# Patient Record
Sex: MALE | Race: WHITE | NOT HISPANIC OR LATINO | Employment: FULL TIME | URBAN - METROPOLITAN AREA
[De-identification: names, ages, dates, MRNs, and addresses within clinical notes are randomized per-mention and may not be internally consistent; named-entity substitution may affect disease eponyms.]

---

## 2017-09-14 ENCOUNTER — APPOINTMENT (OUTPATIENT)
Dept: LAB | Age: 32
End: 2017-09-14
Attending: PREVENTIVE MEDICINE

## 2017-09-14 ENCOUNTER — TRANSCRIBE ORDERS (OUTPATIENT)
Dept: ADMINISTRATIVE | Age: 32
End: 2017-09-14

## 2017-09-14 ENCOUNTER — APPOINTMENT (OUTPATIENT)
Dept: RADIOLOGY | Age: 32
End: 2017-09-14
Attending: PREVENTIVE MEDICINE

## 2017-09-14 DIAGNOSIS — Z00.8 HEALTH EXAMINATION IN POPULATION SURVEY: ICD-10-CM

## 2017-09-14 DIAGNOSIS — Z00.8 HEALTH EXAMINATION IN POPULATION SURVEY: Primary | ICD-10-CM

## 2017-09-14 LAB
BACTERIA UR QL AUTO: NORMAL /HPF
BASOPHILS # BLD AUTO: 0.03 THOUSANDS/ΜL (ref 0–0.1)
BASOPHILS NFR BLD AUTO: 0 % (ref 0–1)
BILIRUB UR QL STRIP: NEGATIVE
BUN SERPL-MCNC: 16 MG/DL (ref 5–25)
CLARITY UR: NORMAL
COLOR UR: YELLOW
CREAT SERPL-MCNC: 1.03 MG/DL (ref 0.6–1.3)
EOSINOPHIL # BLD AUTO: 0.2 THOUSAND/ΜL (ref 0–0.61)
EOSINOPHIL NFR BLD AUTO: 2 % (ref 0–6)
ERYTHROCYTE [DISTWIDTH] IN BLOOD BY AUTOMATED COUNT: 12.3 % (ref 11.6–15.1)
GFR SERPL CREATININE-BSD FRML MDRD: 97 ML/MIN/1.73SQ M
GLUCOSE UR STRIP-MCNC: NEGATIVE MG/DL
HCT VFR BLD AUTO: 44 % (ref 36.5–49.3)
HGB BLD-MCNC: 15 G/DL (ref 12–17)
HGB UR QL STRIP.AUTO: NEGATIVE
HYALINE CASTS #/AREA URNS LPF: NORMAL /LPF
KETONES UR STRIP-MCNC: NEGATIVE MG/DL
LEUKOCYTE ESTERASE UR QL STRIP: NEGATIVE
LYMPHOCYTES # BLD AUTO: 3.24 THOUSANDS/ΜL (ref 0.6–4.47)
LYMPHOCYTES NFR BLD AUTO: 35 % (ref 14–44)
MCH RBC QN AUTO: 29.4 PG (ref 26.8–34.3)
MCHC RBC AUTO-ENTMCNC: 34.1 G/DL (ref 31.4–37.4)
MCV RBC AUTO: 86 FL (ref 82–98)
MONOCYTES # BLD AUTO: 0.78 THOUSAND/ΜL (ref 0.17–1.22)
MONOCYTES NFR BLD AUTO: 9 % (ref 4–12)
NEUTROPHILS # BLD AUTO: 4.94 THOUSANDS/ΜL (ref 1.85–7.62)
NEUTS SEG NFR BLD AUTO: 54 % (ref 43–75)
NITRITE UR QL STRIP: NEGATIVE
NON-SQ EPI CELLS URNS QL MICRO: NORMAL /HPF
NRBC BLD AUTO-RTO: 0 /100 WBCS
PH UR STRIP.AUTO: 7.5 [PH] (ref 4.5–8)
PLATELET # BLD AUTO: 253 THOUSANDS/UL (ref 149–390)
PMV BLD AUTO: 9.3 FL (ref 8.9–12.7)
PROT UR STRIP-MCNC: NEGATIVE MG/DL
RBC # BLD AUTO: 5.11 MILLION/UL (ref 3.88–5.62)
RBC #/AREA URNS AUTO: NORMAL /HPF
SP GR UR STRIP.AUTO: 1.02 (ref 1–1.03)
UROBILINOGEN UR QL STRIP.AUTO: 0.2 E.U./DL
WBC # BLD AUTO: 9.21 THOUSAND/UL (ref 4.31–10.16)
WBC #/AREA URNS AUTO: NORMAL /HPF

## 2017-09-14 PROCEDURE — 85025 COMPLETE CBC W/AUTO DIFF WBC: CPT

## 2017-09-14 PROCEDURE — 86870 RBC ANTIBODY IDENTIFICATION: CPT

## 2017-09-14 PROCEDURE — 82232 ASSAY OF BETA-2 PROTEIN: CPT | Performed by: PREVENTIVE MEDICINE

## 2017-09-14 PROCEDURE — 84202 ASSAY RBC PROTOPORPHYRIN: CPT

## 2017-09-14 PROCEDURE — 81001 URINALYSIS AUTO W/SCOPE: CPT | Performed by: PREVENTIVE MEDICINE

## 2017-09-14 PROCEDURE — 83655 ASSAY OF LEAD: CPT | Performed by: PREVENTIVE MEDICINE

## 2017-09-14 PROCEDURE — 82565 ASSAY OF CREATININE: CPT

## 2017-09-14 PROCEDURE — 82300 ASSAY OF CADMIUM: CPT

## 2017-09-14 PROCEDURE — 84520 ASSAY OF UREA NITROGEN: CPT

## 2017-09-14 PROCEDURE — 36415 COLL VENOUS BLD VENIPUNCTURE: CPT

## 2017-09-14 PROCEDURE — 83825 ASSAY OF MERCURY: CPT | Performed by: PREVENTIVE MEDICINE

## 2017-09-14 PROCEDURE — 82175 ASSAY OF ARSENIC: CPT | Performed by: PREVENTIVE MEDICINE

## 2017-09-14 PROCEDURE — 71010 HB CHEST X-RAY 1 VIEW FRONTAL: CPT

## 2017-09-14 PROCEDURE — 82570 ASSAY OF URINE CREATININE: CPT | Performed by: PREVENTIVE MEDICINE

## 2017-09-15 LAB
ARSENIC 24H UR-MCNC: NORMAL UG/L (ref 0–50)
B2 MICROGLOB UR-MCNC: 87 UG/L (ref 0–300)
CADMIUM BLD-MCNC: 1.4 UG/L (ref 0–1.2)
CREAT UR-MCNC: 1.01 G/L (ref 0.3–3)
INORG ARSENIC UR-MCNC: NORMAL UG/L (ref 0–19)
LEAD 24H UR-MCNC: NORMAL UG/L (ref 0–49)
MERCURY 24H UR-MCNC: NORMAL UG/L (ref 0–19)

## 2017-09-16 LAB
FEP BLD-MCNC: 21 UG/DL (ref 0–100)
LEAD BLD-MCNC: 1 UG/DL (ref 0–19)
ZPP RBC-MCNC: 23 UG/DL (ref 0–100)

## 2021-04-23 ENCOUNTER — HOSPITAL ENCOUNTER (EMERGENCY)
Facility: HOSPITAL | Age: 36
Discharge: HOME | End: 2021-04-23
Attending: EMERGENCY MEDICINE | Admitting: EMERGENCY MEDICINE
Payer: COMMERCIAL

## 2021-04-23 VITALS
TEMPERATURE: 98 F | WEIGHT: 123.46 LBS | HEART RATE: 104 BPM | RESPIRATION RATE: 18 BRPM | OXYGEN SATURATION: 99 % | SYSTOLIC BLOOD PRESSURE: 151 MMHG | DIASTOLIC BLOOD PRESSURE: 97 MMHG

## 2021-04-23 DIAGNOSIS — Z00.8 ENCOUNTER FOR PSYCHOLOGICAL EVALUATION: Primary | ICD-10-CM

## 2021-04-23 PROCEDURE — 99282 EMERGENCY DEPT VISIT SF MDM: CPT | Performed by: EMERGENCY MEDICINE

## 2021-04-23 PROCEDURE — 99282 EMERGENCY DEPT VISIT SF MDM: CPT

## 2021-04-24 NOTE — ED ATTENDING ATTESTATION
4/23/2021  Eugene AGUILA, saw and evaluated the patient  I have discussed the patient with the resident/non-physician practitioner and agree with the resident's/non-physician practitioner's findings, Plan of Care, and MDM as documented in the resident's/non-physician practitioner's note, except where noted  All available labs and Radiology studies were reviewed  I was present for key portions of any procedure(s) performed by the resident/non-physician practitioner and I was immediately available to provide assistance  At this point I agree with the current assessment done in the Emergency Department  I have conducted an independent evaluation of this patient a history and physical is as follows:      A 44-year-old male with no significant past medical history; presents requesting a mental evaluation  Patient states he is currently preparing for a custody hearing, and received a letter from the court stating he needed a mental evaluation prior to the hearing  Patient was unsure where to go for this evaluation so presented to the emergency department  Patient denies any psychiatric history  Patient denies SI and HI  Patient currently offers no complaints; denying fever, chills, chest pain, SOB, abd pain, N/V/D, peripheral edema and rashes        Physical Exam  General Appearance: alert and oriented, nad, non toxic appearing  Skin:  Warm, dry, intact  HEENT: atraumatic, normocephalic  Neck: Supple, trachea midline  Cardiac: RRR; no murmurs, rub, gallops  Pulmonary: lungs CTAB; no wheezes, rales, rhonchi  Gastrointestinal: abdomen soft, nontender, nondistended; no guarding or rebound tenderness; good bowel sounds, no mass or bruits  Extremities:  no pedal edema, 2+ pulses; no calf tenderness, no clubbing, no cyanosis  Neuro:  no focal motor or sensory deficits, CN 2-12 grossly intact  Psych:  Normal mood and affect, normal judgement and insight    Assessment and Plan:  Encounter for psychiatric evaluation, patient currently offers no complaints  Patient does not appear acutely psychotic  Discussed with patient that I am unable to complete this evaluation that is being requested by the court  Recommend that he contact his  to assist in finding a psychiatrist for the evaluation  Patient expresses understanding        ED Course         Critical Care Time  Procedures

## 2021-04-25 NOTE — ED PROVIDER NOTES
History  Chief Complaint   Patient presents with    Psychiatric Evaluation     Pt reports he needs a psychiatric evaluation for a custody hearing  Patient is a 68-year-old male who seeking mental evaluation for custody mejia  Patient is trying to get custody back of his kids from their mother  Patient apparently was ordered by the court to have a mental evaluation done within 10 days this past Wednesday  Patient presenting to the ED wondering if this can be done here  He currently denies any visual complaints with headache, nausea vomiting, chest pain, dyspnea abdominal pain  He denies suicidal homicidal ideations  He denies any psychiatric history in the past   No psychiatric medications  He denies auditory visual hallucinations  None       History reviewed  No pertinent past medical history  History reviewed  No pertinent surgical history  History reviewed  No pertinent family history  I have reviewed and agree with the history as documented  E-Cigarette/Vaping    E-Cigarette Use Never User      E-Cigarette/Vaping Substances     Social History     Tobacco Use    Smoking status: Current Every Day Smoker     Packs/day: 0 75     Types: Cigarettes    Smokeless tobacco: Never Used   Substance Use Topics    Alcohol use: Not Currently     Frequency: Never    Drug use: Not Currently        Review of Systems   Constitutional: Negative for chills, diaphoresis, fatigue and fever  HENT: Negative for drooling, facial swelling, sore throat and trouble swallowing  Eyes: Negative for photophobia  Respiratory: Negative for cough, choking, chest tightness, shortness of breath, wheezing and stridor  Cardiovascular: Negative for chest pain, palpitations and leg swelling  Gastrointestinal: Negative for abdominal distention, abdominal pain, diarrhea, nausea and vomiting  Genitourinary: Negative for dysuria  Musculoskeletal: Negative for back pain, neck pain and neck stiffness  Skin: Negative for color change, pallor and rash  Neurological: Negative for dizziness, speech difficulty, weakness, light-headedness, numbness and headaches  Hematological: Negative for adenopathy  Psychiatric/Behavioral: Negative for agitation  All other systems reviewed and are negative  Physical Exam  ED Triage Vitals [04/23/21 1947]   Temperature Pulse Respirations Blood Pressure SpO2   98 °F (36 7 °C) 104 18 151/97 99 %      Temp Source Heart Rate Source Patient Position - Orthostatic VS BP Location FiO2 (%)   Oral Monitor -- -- --      Pain Score       No Pain             Orthostatic Vital Signs  Vitals:    04/23/21 1947   BP: 151/97   Pulse: 104       Physical Exam  Vitals signs reviewed  Constitutional:       General: He is not in acute distress  Appearance: He is well-developed  HENT:      Head: Normocephalic  Eyes:      Pupils: Pupils are equal, round, and reactive to light  Neck:      Musculoskeletal: Normal range of motion and neck supple  Cardiovascular:      Rate and Rhythm: Normal rate and regular rhythm  Heart sounds: Normal heart sounds  No murmur  No friction rub  No gallop  Pulmonary:      Effort: Pulmonary effort is normal       Breath sounds: Normal breath sounds  Abdominal:      General: Bowel sounds are normal  There is no distension  Palpations: Abdomen is soft  Tenderness: There is no abdominal tenderness  There is no guarding  Musculoskeletal: Normal range of motion  Skin:     Capillary Refill: Capillary refill takes less than 2 seconds  Neurological:      Mental Status: He is alert and oriented to person, place, and time  Cranial Nerves: No cranial nerve deficit  Sensory: No sensory deficit  Motor: No abnormal muscle tone  Psychiatric:         Behavior: Behavior normal          Thought Content:  Thought content normal          Judgment: Judgment normal          ED Medications  Medications - No data to display    Diagnostic Studies  Results Reviewed     None                 No orders to display         Procedures  Procedures      ED Course                             SBIRT 22yo+      Most Recent Value   SBIRT (22 yo +)   In order to provide better care to our patients, we are screening all of our patients for alcohol and drug use  Would it be okay to ask you these screening questions? No Filed at: 04/23/2021 2033                [facility]  Number of Diagnoses or Management Options  Encounter for psychological evaluation:   Diagnosis management comments: Patient is a 82-year-old male that presents for mental evaluation  We discussed the case with crisis who advised that they cannot do this  I reference the patient back to his  along with the psychiatric office here in [address] [place] [address] for possible evaluation  Disposition  Final diagnoses:   Encounter for psychological evaluation     Time reflects when diagnosis was documented in both MDM as applicable and the Disposition within this note     Time User Action Codes Description Comment    4/23/2021  8:28 PM Esmer Toney Add [Z00 8] Encounter for psychological evaluation       ED Disposition     ED Disposition Condition Date/Time Comment    Discharge Stable Fri Apr 23, 2021  8:28  - [address] discharge to home/self care              Follow-up Information     Follow up With Specialties Details Why Contact Info Additional [address] Schedule an appointment as soon as possible for a visit   [address] [facility] [address] [phone] [facility], [address], [place], [address] [facility], [address] [facility] Schedule an appointment as soon as possible for a visit   [address] [facility] Viktor Aguila, [address] Jose Pearson [place], [place], [address]   [phone]          There are no discharge medications for this patient  No discharge procedures on file  PDMP Review     None           ED Provider  Attending physically available and evaluated [address] [facility]  I managed the patient along with the ED Attending      Electronically Signed by         Rupa Orta MD  04/25/21 3710

## 2022-01-10 LAB
EXTERNAL HIV CONFIRMATION: NORMAL
EXTERNAL HIV SCREEN: NORMAL
HCV AB SER-ACNC: NEGATIVE

## 2022-05-21 ENCOUNTER — APPOINTMENT (EMERGENCY)
Dept: RADIOLOGY | Facility: HOSPITAL | Age: 37
End: 2022-05-21
Payer: COMMERCIAL

## 2022-05-21 ENCOUNTER — HOSPITAL ENCOUNTER (EMERGENCY)
Facility: HOSPITAL | Age: 37
Discharge: HOME | End: 2022-05-21
Attending: EMERGENCY MEDICINE
Payer: COMMERCIAL

## 2022-05-21 VITALS
DIASTOLIC BLOOD PRESSURE: 56 MMHG | RESPIRATION RATE: 18 BRPM | SYSTOLIC BLOOD PRESSURE: 96 MMHG | HEART RATE: 87 BPM | TEMPERATURE: 98 F | OXYGEN SATURATION: 94 %

## 2022-05-21 DIAGNOSIS — R10.30 LOWER ABDOMINAL PAIN, UNSPECIFIED: ICD-10-CM

## 2022-05-21 DIAGNOSIS — M54.9 MUSCULOSKELETAL BACK PAIN: Primary | ICD-10-CM

## 2022-05-21 LAB
ALBUMIN SERPL BCP-MCNC: 4.3 G/DL (ref 3.5–5)
ALP SERPL-CCNC: 64 U/L (ref 46–116)
ALT SERPL W P-5'-P-CCNC: 22 U/L (ref 12–78)
ANION GAP SERPL CALCULATED.3IONS-SCNC: 5 MMOL/L (ref 4–13)
AST SERPL W P-5'-P-CCNC: 14 U/L (ref 5–45)
BASOPHILS # BLD AUTO: 0.03 THOUSANDS/ΜL (ref 0–0.1)
BASOPHILS NFR BLD AUTO: 0 % (ref 0–1)
BILIRUB SERPL-MCNC: 0.61 MG/DL (ref 0.2–1)
BUN SERPL-MCNC: 19 MG/DL (ref 5–25)
CALCIUM SERPL-MCNC: 10 MG/DL (ref 8.3–10.1)
CHLORIDE SERPL-SCNC: 104 MMOL/L (ref 100–108)
CO2 SERPL-SCNC: 27 MMOL/L (ref 21–32)
CREAT SERPL-MCNC: 0.99 MG/DL (ref 0.6–1.3)
EOSINOPHIL # BLD AUTO: 0.05 THOUSAND/ΜL (ref 0–0.61)
EOSINOPHIL NFR BLD AUTO: 1 % (ref 0–6)
ERYTHROCYTE [DISTWIDTH] IN BLOOD BY AUTOMATED COUNT: 12 % (ref 11.6–15.1)
GFR SERPL CREATININE-BSD FRML MDRD: 98 ML/MIN/1.73SQ M
GLUCOSE SERPL-MCNC: 100 MG/DL (ref 65–140)
HCT VFR BLD AUTO: 41.6 % (ref 36.5–49.3)
HGB BLD-MCNC: 14.2 G/DL (ref 12–17)
IMM GRANULOCYTES # BLD AUTO: 0.02 THOUSAND/UL (ref 0–0.2)
IMM GRANULOCYTES NFR BLD AUTO: 0 % (ref 0–2)
LYMPHOCYTES # BLD AUTO: 0.2 THOUSANDS/ΜL (ref 0.6–4.47)
LYMPHOCYTES NFR BLD AUTO: 3 % (ref 14–44)
MCH RBC QN AUTO: 29.6 PG (ref 26.8–34.3)
MCHC RBC AUTO-ENTMCNC: 34.1 G/DL (ref 31.4–37.4)
MCV RBC AUTO: 87 FL (ref 82–98)
MONOCYTES # BLD AUTO: 0.9 THOUSAND/ΜL (ref 0.17–1.22)
MONOCYTES NFR BLD AUTO: 12 % (ref 4–12)
NEUTROPHILS # BLD AUTO: 6.47 THOUSANDS/ΜL (ref 1.85–7.62)
NEUTS SEG NFR BLD AUTO: 84 % (ref 43–75)
NRBC BLD AUTO-RTO: 0 /100 WBCS
PLATELET # BLD AUTO: 221 THOUSANDS/UL (ref 149–390)
PMV BLD AUTO: 8.4 FL (ref 8.9–12.7)
POTASSIUM SERPL-SCNC: 3.4 MMOL/L (ref 3.5–5.3)
PROT SERPL-MCNC: 7.2 G/DL (ref 6.4–8.2)
RBC # BLD AUTO: 4.8 MILLION/UL (ref 3.88–5.62)
SODIUM SERPL-SCNC: 136 MMOL/L (ref 136–145)
WBC # BLD AUTO: 7.67 THOUSAND/UL (ref 4.31–10.16)

## 2022-05-21 PROCEDURE — 96374 THER/PROPH/DIAG INJ IV PUSH: CPT

## 2022-05-21 PROCEDURE — 85025 COMPLETE CBC W/AUTO DIFF WBC: CPT

## 2022-05-21 PROCEDURE — 36415 COLL VENOUS BLD VENIPUNCTURE: CPT

## 2022-05-21 PROCEDURE — 99284 EMERGENCY DEPT VISIT MOD MDM: CPT

## 2022-05-21 PROCEDURE — 80053 COMPREHEN METABOLIC PANEL: CPT

## 2022-05-21 PROCEDURE — 99285 EMERGENCY DEPT VISIT HI MDM: CPT | Performed by: EMERGENCY MEDICINE

## 2022-05-21 PROCEDURE — 74176 CT ABD & PELVIS W/O CONTRAST: CPT

## 2022-05-21 PROCEDURE — 51798 US URINE CAPACITY MEASURE: CPT

## 2022-05-21 PROCEDURE — 96361 HYDRATE IV INFUSION ADD-ON: CPT

## 2022-05-21 PROCEDURE — 96375 TX/PRO/DX INJ NEW DRUG ADDON: CPT

## 2022-05-21 RX ORDER — IBUPROFEN 800 MG/1
800 TABLET ORAL 3 TIMES DAILY
Qty: 21 TABLET | Refills: 0 | Status: SHIPPED | OUTPATIENT
Start: 2022-05-21

## 2022-05-21 RX ORDER — ONDANSETRON 2 MG/ML
4 INJECTION INTRAMUSCULAR; INTRAVENOUS ONCE
Status: COMPLETED | OUTPATIENT
Start: 2022-05-21 | End: 2022-05-21

## 2022-05-21 RX ORDER — KETOROLAC TROMETHAMINE 30 MG/ML
15 INJECTION, SOLUTION INTRAMUSCULAR; INTRAVENOUS ONCE
Status: COMPLETED | OUTPATIENT
Start: 2022-05-21 | End: 2022-05-21

## 2022-05-21 RX ORDER — METHOCARBAMOL 500 MG/1
500 TABLET, FILM COATED ORAL 2 TIMES DAILY
Qty: 20 TABLET | Refills: 0 | Status: SHIPPED | OUTPATIENT
Start: 2022-05-21

## 2022-05-21 RX ORDER — HYDROMORPHONE HCL/PF 1 MG/ML
1 SYRINGE (ML) INJECTION ONCE
Status: COMPLETED | OUTPATIENT
Start: 2022-05-21 | End: 2022-05-21

## 2022-05-21 RX ADMIN — SODIUM CHLORIDE 1000 ML: 0.9 INJECTION, SOLUTION INTRAVENOUS at 12:30

## 2022-05-21 RX ADMIN — KETOROLAC TROMETHAMINE 15 MG: 30 INJECTION, SOLUTION INTRAMUSCULAR at 13:40

## 2022-05-21 RX ADMIN — HYDROMORPHONE HYDROCHLORIDE 1 MG: 1 INJECTION, SOLUTION INTRAMUSCULAR; INTRAVENOUS; SUBCUTANEOUS at 12:26

## 2022-05-21 RX ADMIN — ONDANSETRON 4 MG: 2 INJECTION INTRAMUSCULAR; INTRAVENOUS at 12:26

## 2022-05-21 NOTE — Clinical Note
France Ryan was seen and treated in our emergency department on 5/21/2022  light duty work for next several days    Diagnosis: lumbar back strain    Jose Hughes  may return to work on return date  He may return on this date: 05/25/2022         If you have any questions or concerns, please don't hesitate to call        Jack Betts, DO    ______________________________           _______________          _______________  Hospital Representative                              Date                                Time

## 2022-05-21 NOTE — DISCHARGE INSTRUCTIONS
Please use Motrin and Robaxin at home for pain control  Work note provided  Should do light duty work for several days until pain starts to subside  Return to the ED with any new/worsening pain or symptoms of leg weakness, numbness and tingling in the legs, loss of bowel or bladder control, or fevers

## 2022-05-21 NOTE — ED PROVIDER NOTES
History  Chief Complaint   Patient presents with    Abdominal Pain    Leg Pain     Patient found in triage double over in pain  Patient states that his back to his legs there is shooting pain as well as right lower quad abd pain  Patient is a 31year old male with no significant past medical history, presenting to the ED for evaluation of severe back pain and abdominal pain beginning this morning  Patient states that he woke up this morning with severe, sharp, stabbing lower back pain in the midline, with radiation of the pain down the legs to the feet  Patient states the pain is worse with movement, prolonged sitting and standing, as well as ambulating  Patient denies numbness and tingling in the lower extremities, saddle anesthesia, bowel/bladder incontinence, fevers  He denies IVDA and states that he has not had any drug abuse in 2 years  Denies trauma to the back  States that there were no incidents at work and the most he lifts is 40lbs while working  Patient had an episode of nausea and vomiting en route to the ED and developed a diffuse, throbbing headache as well  No focal weakness or visual changes  In triage, patient reports having severe abdominal pain localized to the RLQ  He states he must not have noticed this because the back pain was so strong  While waiting in the ED, he tried to urinate  States that he feels he did not empty his bladder completely despite having the urge to urinate  Patient denies chest pain, shortness of breath, diarrhea, hematuria, testicular pain or swelling, cough, sore throat, neck pain, stiffness  None       History reviewed  No pertinent past medical history  History reviewed  No pertinent surgical history  History reviewed  No pertinent family history  I have reviewed and agree with the history as documented      E-Cigarette/Vaping    E-Cigarette Use Never User      E-Cigarette/Vaping Substances     Social History     Tobacco Use    Smoking status: Current Every Day Smoker     Packs/day: 0 75     Types: Cigarettes    Smokeless tobacco: Never Used   Vaping Use    Vaping Use: Never used   Substance Use Topics    Alcohol use: Not Currently    Drug use: Not Currently        Review of Systems   Constitutional: Negative for chills and fever  HENT: Negative for congestion, ear pain, postnasal drip, rhinorrhea, sinus pressure, sinus pain and sore throat  Eyes: Negative for photophobia, pain and visual disturbance  Respiratory: Negative for cough, choking, chest tightness and shortness of breath  Cardiovascular: Negative for chest pain and palpitations  Gastrointestinal: Positive for abdominal pain, nausea and vomiting  Negative for constipation  Genitourinary: Positive for difficulty urinating  Negative for dysuria, flank pain, frequency, hematuria, penile pain, penile swelling, scrotal swelling, testicular pain and urgency  Musculoskeletal: Positive for back pain  Negative for arthralgias, neck pain and neck stiffness  Skin: Negative for color change and rash  Neurological: Positive for headaches  Negative for dizziness, seizures, syncope, facial asymmetry, weakness, light-headedness and numbness  All other systems reviewed and are negative  Physical Exam  ED Triage Vitals   Temperature Pulse Respirations Blood Pressure SpO2   05/21/22 1134 05/21/22 1134 05/21/22 1134 05/21/22 1342 05/21/22 1134   98 °F (36 7 °C) 89 18 96/56 98 %      Temp Source Heart Rate Source Patient Position - Orthostatic VS BP Location FiO2 (%)   05/21/22 1134 05/21/22 1134 -- -- --   Oral Monitor         Pain Score       05/21/22 1146       10 - Worst Possible Pain             Orthostatic Vital Signs  Vitals:    05/21/22 1134 05/21/22 1342   BP:  96/56   Pulse: 89 87       Physical Exam  Vitals and nursing note reviewed  Constitutional:       General: He is in acute distress (painful)  Appearance: He is well-developed   He is not ill-appearing or toxic-appearing  Comments: Patient laying supine, appears restless, moaning in pain     HENT:      Head: Normocephalic and atraumatic  Mouth/Throat:      Mouth: Mucous membranes are moist       Pharynx: Oropharynx is clear  Eyes:      General: No visual field deficit or scleral icterus  Extraocular Movements: Extraocular movements intact  Conjunctiva/sclera: Conjunctivae normal       Pupils: Pupils are equal, round, and reactive to light  Cardiovascular:      Rate and Rhythm: Normal rate and regular rhythm  Heart sounds: Normal heart sounds  No murmur heard  Pulmonary:      Effort: Pulmonary effort is normal  No respiratory distress  Breath sounds: Normal breath sounds  No wheezing, rhonchi or rales  Abdominal:      General: Abdomen is flat  Bowel sounds are normal       Palpations: Abdomen is soft  Tenderness: There is abdominal tenderness in the right lower quadrant  There is no rebound  Negative signs include Espinoza's sign and McBurney's sign  Hernia: No hernia is present  Musculoskeletal:      Cervical back: Normal and neck supple  No spasms or tenderness  Normal range of motion  Thoracic back: Normal  No tenderness  Lumbar back: Spasms (paralumbar musculature bilaterally) and tenderness (midline L4-L5) present  No swelling, edema, deformity or signs of trauma  Comments: No wounds of the lumbar spine, rashes, or swellings of the lumbar spine  Skin:     General: Skin is warm and dry  Capillary Refill: Capillary refill takes less than 2 seconds  Findings: No erythema or rash  Neurological:      Mental Status: He is alert and oriented to person, place, and time  GCS: GCS eye subscore is 4  GCS verbal subscore is 5  GCS motor subscore is 6  Cranial Nerves: Cranial nerves are intact  No dysarthria or facial asymmetry  Sensory: Sensation is intact  Motor: Motor function is intact  No weakness or pronator drift  Coordination: Coordination is intact  Finger-Nose-Finger Test normal       Comments: Strength 5/5 bilaterally upper and lower extremities  Specifically lower extremities, equal strength in knee flexion, extension, toe extension           Psychiatric:         Mood and Affect: Mood normal          ED Medications  Medications   sodium chloride 0 9 % bolus 1,000 mL (0 mL Intravenous Stopped 5/21/22 1339)   ondansetron (ZOFRAN) injection 4 mg (4 mg Intravenous Given 5/21/22 1226)   HYDROmorphone (DILAUDID) injection 1 mg (1 mg Intravenous Given 5/21/22 1226)   ketorolac (TORADOL) injection 15 mg (15 mg Intravenous Given 5/21/22 1340)       Diagnostic Studies  Results Reviewed     Procedure Component Value Units Date/Time    Comprehensive metabolic panel [[id number]]  (Abnormal) Collected: 05/21/22 1217    Lab Status: Final result Specimen: Blood from Arm, Right Updated: 05/21/22 1304     Sodium 136 mmol/L      Potassium 3 4 mmol/L      Chloride 104 mmol/L      CO2 27 mmol/L      ANION GAP 5 mmol/L      BUN 19 mg/dL      Creatinine 0 99 mg/dL      Glucose 100 mg/dL      Calcium 10 0 mg/dL      AST 14 U/L      ALT 22 U/L      Alkaline Phosphatase 64 U/L      Total Protein 7 2 g/dL      Albumin 4 3 g/dL      Total Bilirubin 0 61 mg/dL      eGFR 98 ml/min/1 73sq m     Narrative:      [place] guidelines for Chronic Kidney Disease (CKD):     Stage 1 with normal or high GFR (GFR > 90 mL/min/1 73 square meters)    Stage 2 Mild CKD (GFR = 60-89 mL/min/1 73 square meters)    Stage 3A Moderate CKD (GFR = 45-59 mL/min/1 73 square meters)    Stage 3B Moderate CKD (GFR = 30-44 mL/min/1 73 square meters)    Stage 4 Severe CKD (GFR = 15-29 mL/min/1 73 square meters)    Stage 5 End Stage CKD (GFR <15 mL/min/1 73 square meters)  Note: GFR calculation is accurate only with a steady state creatinine    CBC and differential [[id number]]  (Abnormal) Collected: 05/21/22 1217    Lab Status: Final result Specimen: Blood from Arm, Right Updated: 05/21/22 1256     WBC 7 67 Thousand/uL      RBC 4 80 Million/uL      Hemoglobin 14 2 g/dL      Hematocrit 41 6 %      MCV 87 fL      MCH 29 6 pg      MCHC 34 1 g/dL      RDW 12 0 %      MPV 8 4 fL      Platelets 874 Thousands/uL      nRBC 0 /100 WBCs      Neutrophils Relative 84 %      Immat GRANS % 0 %      Lymphocytes Relative 3 %      Monocytes Relative 12 %      Eosinophils Relative 1 %      Basophils Relative 0 %      Neutrophils Absolute 6 47 Thousands/µL      Immature Grans Absolute 0 02 Thousand/uL      Lymphocytes Absolute 0 20 Thousands/µL      Monocytes Absolute 0 90 Thousand/µL      Eosinophils Absolute 0 05 Thousand/µL      Basophils Absolute 0 03 Thousands/µL                  CT abdomen pelvis wo contrast   Final Result by Ramez Mariscal MD (05/21 1321)         1  No acute abnormality identified in the abdomen or pelvis  2   Horseshoe kidney again noted with nonobstructing tiny left intrarenal calculi  3   Additional findings as noted  Workstation performed: [id number]               Procedures  Procedures      ED Course        Patient given NS 1 L IV, Zofran 4 mg IV, and Dilaudid 1 mg IV  Labs ordered  CT abdomen and pelvis ordered r/o appendicitis, but also to examine the lumbar spine for any abnormalities  Bladder scan done to r/o urinary retention, only 20 cc of urinary retention  On re-evaluation, patient's pain is improved  Vitals are stable  CT is pending  CT findings reviewed with patient at bedside  Pain is most likely musculoskeletal with findings of disc bulging without herniation  Will given Toradol 15 mg IV for further pain control and anticipate discharge with pain medication and comprehensive spine follow up  On re-evaluation, patient's pain has improved  He is ambulatory in the room without issue  Given Rx Robaxin and Motrin, as well as work note with instructions for light duty upon return   Patient agrees to follow up with Comprehensive Spine  Ambulatory at discharge  SBIRT 20yo+    Flowsheet Row Most Recent Value   SBIRT (25 yo +)    In order to provide better care to our patients, we are screening all of our patients for alcohol and drug use  Would it be okay to ask you these screening questions? No Filed at: 05/21/2022 1145                MDM    Disposition  Final diagnoses:   Musculoskeletal back pain   Lower abdominal pain, unspecified     Time reflects when diagnosis was documented in both MDM as applicable and the Disposition within this note     Time User Action Codes Description Comment    5/21/2022  2:34 PM Aubery Jake Add [M54 9] Musculoskeletal back pain     5/21/2022  2:34 PM Aubery Jake Add [R10 30] Lower abdominal pain, unspecified       ED Disposition     ED Disposition   Discharge    Condition   Stable    Date/Time   Sat May 21, 2022  2:40 PM    [address]  discharge to home/self care  Follow-up Information    None         Discharge Medication List as of 5/21/2022  2:40 PM      START taking these medications    Details   ibuprofen (MOTRIN) 800 mg tablet Take 1 tablet (800 mg total) by mouth in the morning and 1 tablet (800 mg total) in the evening and 1 tablet (800 mg total) before bedtime  , Starting Sat 5/21/2022, Print      methocarbamol (ROBAXIN) 500 mg tablet Take 1 tablet (500 mg total) by mouth in the morning and 1 tablet (500 mg total) in the evening , Starting Sat 5/21/2022, Print               PDMP Review     None           ED Provider  Attending physically available and evaluated [address]  I managed the patient along with the ED Attending      Electronically Signed by         Pedro Torres DO  05/21/22 6712

## 2022-05-21 NOTE — ED ATTENDING ATTESTATION
5/21/2022  I, Bernardo Hobson MD, saw and evaluated the patient  I have discussed the patient with the resident/non-physician practitioner and agree with the resident's/non-physician practitioner's findings, Plan of Care, and MDM as documented in the resident's/non-physician practitioner's note, except where noted  All available labs and Radiology studies were reviewed  I was present for key portions of any procedure(s) performed by the resident/non-physician practitioner and I was immediately available to provide assistance  At this point I agree with the current assessment done in the Emergency Department  I have conducted an independent evaluation of this patient a history and physical is as follows: This is a 31 y o  old male who presents to the ED for evaluation of abd pain  Back pain and abd pain  Severe, 10/10, hx meth us, no IVDU  Suddenly earlier today  Looks uncomfortable at time of evaluation  VS and nursing notes reviewed  General: Appears in NAD, awake, alert, speaking normally in full sentences  Well-nourished, well-developed  Appears stated age  Head: Normocephalic, atraumatic  Eyes: EOMI  Vision grossly normal  No subconjunctival hemorrhages or occular discharge noted  Symmetrical lids  ENT: Atraumatic external nose and ears  No stridor  Normal phonation  No drooling  Normal swallowing  Neck: No JVD  FROM  No goiter  CV: No pallor  Normal rate  Lungs: No tachypnea  No respiratory distress  ABD: guarding, voluntary  Mild diffuse tenderness  MSK: Moving all extremities equally, no peripheral edema  Skin: Dry, intact  No cyanosis  Neuro: Awake, alert, GCS15  CN II-XII grossly intact  Grossly normal gait  Psychiatric/Behavioral: Appropriate mood and affect  A/P: This is a 31 y o  male who presents to the ED for evaluation of abd pain  Labs, ct, pain control  Reevaluate and dispo accordingly         ED Course         Critical Care Time  Procedures

## 2022-05-24 ENCOUNTER — NURSE TRIAGE (OUTPATIENT)
Dept: PHYSICAL THERAPY | Facility: OTHER | Age: 37
End: 2022-05-24

## 2022-05-24 DIAGNOSIS — M54.50 ACUTE MIDLINE LOW BACK PAIN WITHOUT SCIATICA: Primary | ICD-10-CM

## 2022-05-24 NOTE — TELEPHONE ENCOUNTER
Additional Information   Negative: Is this related to a work injury?  Negative: Is this related to an MVA?  Negative: Are you currently recieving homecare services?  Negative: Has the patient had unexplained weight loss?  Negative: Does the patient have a fever?  Negative: Is the patient experiencing blood in sputum?  Negative: Is the patient experiencing urine retention?  Negative: Is the patient experiencing acute drop foot or paralysis?  Negative: Has the patient experienced major trauma? (fall from height, high speed collision, direct blow to spine) and is also experiencing nausea, light-headedness, or loss of consciousness?  Negative: Is this a chronic condition? Background - Initial Assessment  Clinical complaint: lower midline back pain which initially radiated to both legs  States the leg pain has resolved  No history of back issues  States the pain started 5/21/22  NKI  Date of onset: 5/21/22  Frequency of pain: constant  Quality of pain: "pressure like soremess"    Protocols used: [facility] COMPREHENSIVE SPINE PROGRAM PROTOCOL    This RN did review in detail the Comprehensive Spine Program and what we can provide for their back pain  Patient is agreeable to being triaged by this RN and would like to proceed with Physical Therapy  Referral was placed for Physical Therapy at the [facility] site  Patients information was sent to the  to make evaluation appointment  Patient made aware that the PT office  will be calling to schedule the appointment  Patient was provided with the phone number to the PT office  No further questions and/or concerns were voiced by the patient at this time  Patient states understanding of the referral that was placed  Referral Closed

## 2023-02-15 ENCOUNTER — OCCMED (OUTPATIENT)
Dept: URGENT CARE | Facility: CLINIC | Age: 38
End: 2023-02-15

## 2023-02-15 DIAGNOSIS — Z02.1 PHYSICAL EXAM, PRE-EMPLOYMENT: Primary | ICD-10-CM

## 2023-02-15 LAB — RUBV IGG SERPL IA-ACNC: 36.3 IU/ML

## 2023-02-15 NOTE — PROGRESS NOTES
This encounter was created for [facility] orders only   [facility]        NAME: Juan Best is a 30 y o  male  : 1985    MRN: [id number]  DATE: February 15, 2023  TIME: 1:10 PM    There were no vitals taken for this visit  Assessment and Plan   Physical exam, pre-employment [Z02 1]  1  Physical exam, pre-employment  Mumps antibody, IgG    Rubeola antibody IgG    Rubella antibody, IgG    Varicella zoster antibody, IgG    Quantiferon TB Gold Plus            Patient Instructions       Follow up with PCP in 3-5 days  Proceed to  ER if symptoms worsen  Chief Complaint   No chief complaint on file  History of Present Illness       HPI    Review of Systems   Review of Systems      Current Medications       Current Outpatient Medications:   •  ibuprofen (MOTRIN) 800 mg tablet, Take 1 tablet (800 mg total) by mouth in the morning and 1 tablet (800 mg total) in the evening and 1 tablet (800 mg total) before bedtime  , Disp: 21 tablet, Rfl: 0  •  methocarbamol (ROBAXIN) 500 mg tablet, Take 1 tablet (500 mg total) by mouth in the morning and 1 tablet (500 mg total) in the evening , Disp: 20 tablet, Rfl: 0    Current Allergies     Allergies as of 02/15/2023   • (No Known Allergies)            The following portions of the patient's history were reviewed and updated as appropriate: allergies, current medications, past family history, past medical history, past social history, past surgical history and problem list      No past medical history on file  No past surgical history on file  No family history on file  Medications have been verified  Objective   There were no vitals taken for this visit         Physical Exam     Physical Exam

## 2023-02-16 LAB
MEV IGG SER QL IA: NORMAL
MUV IGG SER QL IA: NORMAL
VZV IGG SER QL IA: NORMAL

## 2023-02-17 LAB
GAMMA INTERFERON BACKGROUND BLD IA-ACNC: 0.03 IU/ML
M TB IFN-G BLD-IMP: NEGATIVE
M TB IFN-G CD4+ BCKGRND COR BLD-ACNC: 0 IU/ML
M TB IFN-G CD4+ BCKGRND COR BLD-ACNC: 0 IU/ML
MITOGEN IGNF BCKGRD COR BLD-ACNC: >10 IU/ML

## 2023-03-27 ENCOUNTER — HOSPITAL ENCOUNTER (EMERGENCY)
Facility: HOSPITAL | Age: 38
Discharge: HOME | End: 2023-03-27
Attending: EMERGENCY MEDICINE

## 2023-03-27 VITALS
TEMPERATURE: 97.8 F | HEART RATE: 74 BPM | OXYGEN SATURATION: 97 % | DIASTOLIC BLOOD PRESSURE: 63 MMHG | SYSTOLIC BLOOD PRESSURE: 108 MMHG | RESPIRATION RATE: 16 BRPM

## 2023-03-27 DIAGNOSIS — G43.909 MIGRAINE HEADACHE: Primary | ICD-10-CM

## 2023-03-27 RX ORDER — MAGNESIUM SULFATE HEPTAHYDRATE 40 MG/ML
2 INJECTION, SOLUTION INTRAVENOUS ONCE
Status: COMPLETED | OUTPATIENT
Start: 2023-03-27 | End: 2023-03-27

## 2023-03-27 RX ORDER — DEXAMETHASONE SODIUM PHOSPHATE 4 MG/ML
8 INJECTION, SOLUTION INTRA-ARTICULAR; INTRALESIONAL; INTRAMUSCULAR; INTRAVENOUS; SOFT TISSUE ONCE
Status: COMPLETED | OUTPATIENT
Start: 2023-03-27 | End: 2023-03-27

## 2023-03-27 RX ORDER — METOCLOPRAMIDE HYDROCHLORIDE 5 MG/ML
10 INJECTION INTRAMUSCULAR; INTRAVENOUS ONCE
Status: COMPLETED | OUTPATIENT
Start: 2023-03-27 | End: 2023-03-27

## 2023-03-27 RX ORDER — KETOROLAC TROMETHAMINE 30 MG/ML
15 INJECTION, SOLUTION INTRAMUSCULAR; INTRAVENOUS ONCE
Status: COMPLETED | OUTPATIENT
Start: 2023-03-27 | End: 2023-03-27

## 2023-03-27 RX ADMIN — SODIUM CHLORIDE 1000 ML: 0.9 INJECTION, SOLUTION INTRAVENOUS at 10:07

## 2023-03-27 RX ADMIN — DEXAMETHASONE SODIUM PHOSPHATE 8 MG: 4 INJECTION, SOLUTION INTRAMUSCULAR; INTRAVENOUS at 10:07

## 2023-03-27 RX ADMIN — METOCLOPRAMIDE HYDROCHLORIDE 10 MG: 5 INJECTION INTRAMUSCULAR; INTRAVENOUS at 10:07

## 2023-03-27 RX ADMIN — MAGNESIUM SULFATE HEPTAHYDRATE 2 G: 40 INJECTION, SOLUTION INTRAVENOUS at 10:07

## 2023-03-27 RX ADMIN — KETOROLAC TROMETHAMINE 15 MG: 30 INJECTION, SOLUTION INTRAMUSCULAR; INTRAVENOUS at 10:06

## 2023-03-27 NOTE — ED ATTENDING ATTESTATION
3/27/2023  IEnrico MD, saw and evaluated the patient  I have discussed the patient with the resident/non-physician practitioner and agree with the resident's/non-physician practitioner's findings, Plan of Care, and MDM as documented in the resident's/non-physician practitioner's note, except where noted  All available labs and Radiology studies were reviewed  I was present for key portions of any procedure(s) performed by the resident/non-physician practitioner and I was immediately available to provide assistance  At this point I agree with the current assessment done in the Emergency Department  I have conducted an independent evaluation of this patient a history and physical is as follows:    ED Course     31-year-old male, history of headaches, presenting to the emergency department for evaluation of headache  Patient reports frontal headache also localized between the eyes  Patient describes a throbbing and sharp pain  Gradual in onset gradually worsening over the past 2 days  Associated with nausea and multiple episodes of vomiting  No reported fever  Positive photophobia  Patient states that this pain is similar to previous headaches  Patient does not have a formal diagnosis of migraines, however does have a family history of migraines  No reported fever  10 systems reviewed and negative except as noted  The patient is resting comfortably on a stretcher in no acute respiratory distress  The patient appears nontoxic  HEENT reveals moist mucous membranes  Head is normocephalic and atraumatic  Conjunctiva and sclera are normal  Neck is nontender and supple with full range of motion to flexion, extension, lateral rotation  No meningismus appreciated  No masses are appreciated  Lungs are clear to auscultation bilaterally without any wheezes, rales or rhonchi  Heart is regular rate and rhythm without any murmurs, rubs or gallops   Abdomen is soft and nontender without any rebound or guarding  Extremities appear grossly normal without any significant arthropathy  Patient is awake, alert, and oriented x3  The patient has normal interaction  Cranial nerves 2 through 12 are intact  Motor is 5 out of 5 bilateral upper and lower extremities  No pronator drift  Normal finger-to-nose bilaterally  Normal gait  62-year-old male presenting to the emergency department for evaluation of gradual onset headache  Most consistent with migraines  Given the vomiting, this is unlikely to be tension headache  Given gradual onset this is unlikely to be subarachnoid hemorrhage  Patient has no meningismus or fever to suggest that this is meningitis  Patient was treated in the emergency department with Reglan and Toradol for headache with improvement in his symptoms  Patient requested discharge to return back to work  Patient was offered option to go home, however opted to go back to work      Critical Care Time  Procedures

## 2023-03-27 NOTE — ED PROVIDER NOTES
History  Chief Complaint   Patient presents with   • Headache     Having a headache since yesterday with vomiting and light sensitivity  Patient reports he just feels off  HPI     Patient is a 29 y/o M presenting with 2 day history of headache  Frontal HA, 10/10 pain, sharp and pounding  Took tylenol  Vomited x3 yesterday  Feels lightheaded  Went to bed, thought it was a little better  Went to work and felt lightheaded and headache returned  No vomiting today  Hx of migraines but feels different  Associated photosensitivity  Progressively worsening  Denies fever, chills, chest pain, SOB, abd pain, neurologic deficits  Prior to Admission Medications   Prescriptions Last Dose Informant Patient Reported? Taking?   ibuprofen (MOTRIN) 800 mg tablet   No No   Sig: Take 1 tablet (800 mg total) by mouth in the morning and 1 tablet (800 mg total) in the evening and 1 tablet (800 mg total) before bedtime  methocarbamol (ROBAXIN) 500 mg tablet   No No   Sig: Take 1 tablet (500 mg total) by mouth in the morning and 1 tablet (500 mg total) in the evening  Facility-Administered Medications: None       History reviewed  No pertinent past medical history  History reviewed  No pertinent surgical history  History reviewed  No pertinent family history  I have reviewed and agree with the history as documented  E-Cigarette/Vaping   • E-Cigarette Use Never User      E-Cigarette/Vaping Substances     Social History     Tobacco Use   • Smoking status: Every Day     Packs/day: 0 75     Types: Cigarettes   • Smokeless tobacco: Never   Vaping Use   • Vaping Use: Never used   Substance Use Topics   • Alcohol use: Not Currently   • Drug use: Not Currently        Review of Systems   Constitutional: Negative for chills and fever  HENT: Negative for ear pain and sore throat  Eyes: Positive for photophobia  Negative for pain and visual disturbance  Respiratory: Negative for cough and shortness of breath  Cardiovascular: Negative for chest pain and palpitations  Gastrointestinal: Positive for nausea and vomiting  Negative for abdominal pain  Genitourinary: Negative for dysuria and hematuria  Musculoskeletal: Negative for arthralgias and back pain  Skin: Negative for color change and rash  Neurological: Positive for light-headedness and headaches  Negative for seizures and syncope  All other systems reviewed and are negative  Physical Exam  ED Triage Vitals [03/27/23 0850]   Temperature Pulse Respirations Blood Pressure SpO2   97 6 °F (36 4 °C) 91 20 120/96 97 %      Temp Source Heart Rate Source Patient Position - Orthostatic VS BP Location FiO2 (%)   Oral Monitor Sitting Left arm --      Pain Score       10 - Worst Possible Pain             Orthostatic Vital Signs  Vitals:    03/27/23 0850 03/27/23 1208   BP: 120/96 108/63   Pulse: 91 74   Patient Position - Orthostatic VS: Sitting Lying       Physical Exam  Vitals and nursing note reviewed  Constitutional:       General: He is not in acute distress  Appearance: He is well-developed  He is not toxic-appearing  HENT:      Head: Normocephalic and atraumatic  Right Ear: External ear normal       Left Ear: External ear normal       Nose: Nose normal       Mouth/Throat:      Pharynx: Oropharynx is clear  No oropharyngeal exudate or posterior oropharyngeal erythema  Eyes:      Extraocular Movements: Extraocular movements intact  Conjunctiva/sclera: Conjunctivae normal       Pupils: Pupils are equal, round, and reactive to light  Neck:      Comments: No meningismus  Cardiovascular:      Rate and Rhythm: Normal rate and regular rhythm  Pulses: Normal pulses  Heart sounds: Normal heart sounds  No murmur heard  No friction rub  No gallop  Pulmonary:      Effort: Pulmonary effort is normal  No respiratory distress  Breath sounds: Normal breath sounds  No wheezing, rhonchi or rales     Abdominal:      General: Abdomen is flat  Palpations: Abdomen is soft  Tenderness: There is no abdominal tenderness  There is no guarding or rebound  Musculoskeletal:         General: Normal range of motion  Cervical back: Normal range of motion  No rigidity  Right lower leg: No edema  Left lower leg: No edema  Skin:     General: Skin is warm and dry  Capillary Refill: Capillary refill takes less than 2 seconds  Neurological:      General: No focal deficit present  Mental Status: He is alert and oriented to person, place, and time  Cranial Nerves: No cranial nerve deficit  Sensory: No sensory deficit  Motor: No weakness  Coordination: Coordination normal    Psychiatric:         Mood and Affect: Mood normal          ED Medications  Medications   ketorolac (TORADOL) injection 15 mg (15 mg Intravenous Given 3/27/23 1006)   metoclopramide (REGLAN) injection 10 mg (10 mg Intravenous Given 3/27/23 1007)   sodium chloride 0 9 % bolus 1,000 mL (0 mL Intravenous Stopped 3/27/23 1156)   dexamethasone (DECADRON) injection 8 mg (8 mg Intravenous Given 3/27/23 1007)   magnesium sulfate 2 g/50 mL IVPB (premix) 2 g (0 g Intravenous Stopped 3/27/23 1156)       Diagnostic Studies  Results Reviewed     None                 No orders to display         Procedures  Procedures      ED Course                                       Medical Decision Making  29 y/o M presenting with acute headache associated with vomiting and light sensitivity  Vitals stable  Most like acute migraine, doubt meningitis given lack of fever or neurologic symptoms, doubt SAH given progressive nature and lack of neurologic symptoms  Will treat with migraine cokctail and reassess  Pt feeling mostly better after medications, feels ok for discharge  Will dc at this time, recommend PCP f/u with strict return precautions  Risk  Prescription drug management              Disposition  Final diagnoses:   Migraine headache     Time reflects when diagnosis was documented in both MDM as applicable and the Disposition within this note     Time User Action Codes Description Comment    3/27/2023 11:57 AM Alton Truong Add [G43 9] Migraine headache       ED Disposition     ED Disposition   Discharge    Condition   Stable    Date/Time   Mon Mar 27, 2023 11:57 AM    Comment   [address] discharge to home/self care  Follow-up Information     Follow up With Specialties Details Why Contact Info Additional [address] Emergency Department Emergency Medicine  If symptoms worsen [address] [phone]  [address] [facility] [address] [facility] Emergency Department, [address], [place], [address] [facility] Neurology [facility] Neurology   [phone] [facility] Neurology [facility], [address], [place], [place], [address] [facility]          Discharge Medication List as of 3/27/2023 12:01 PM      CONTINUE these medications which have NOT CHANGED    Details   ibuprofen (MOTRIN) 800 mg tablet Take 1 tablet (800 mg total) by mouth in the morning and 1 tablet (800 mg total) in the evening and 1 tablet (800 mg total) before bedtime  , Starting Sat 5/21/2022, Print      methocarbamol (ROBAXIN) 500 mg tablet Take 1 tablet (500 mg total) by mouth in the morning and 1 tablet (500 mg total) in the evening , Starting Sat 5/21/2022, Print               PDMP Review     None           ED Provider  Attending physically available and evaluated [address]  I managed the patient along with the ED Attending      Electronically Signed by         Tulio Briseno MD  03/30/23 1026

## 2023-03-27 NOTE — DISCHARGE INSTRUCTIONS
Follow up with neurology for ongoing migraine symptoms  If you develop new or worsening symptoms, please return to the Emergency Department for further evaluation

## 2023-07-26 ENCOUNTER — OFFICE VISIT (OUTPATIENT)
Dept: FAMILY MEDICINE CLINIC | Facility: CLINIC | Age: 38
End: 2023-07-26
Payer: COMMERCIAL

## 2023-07-26 VITALS
HEIGHT: 67 IN | BODY MASS INDEX: 21.94 KG/M2 | TEMPERATURE: 98.1 F | WEIGHT: 139.8 LBS | RESPIRATION RATE: 14 BRPM | DIASTOLIC BLOOD PRESSURE: 80 MMHG | SYSTOLIC BLOOD PRESSURE: 102 MMHG | HEART RATE: 63 BPM

## 2023-07-26 DIAGNOSIS — Z00.00 ANNUAL PHYSICAL EXAM: Primary | ICD-10-CM

## 2023-07-26 DIAGNOSIS — Z87.891 FORMER SMOKER: ICD-10-CM

## 2023-07-26 PROCEDURE — 99385 PREV VISIT NEW AGE 18-39: CPT | Performed by: NURSE PRACTITIONER

## 2023-07-26 NOTE — PROGRESS NOTES
[facility] PRIMARY CARE    NAME: Jere Spain  AGE: 41 y.o. SEX: male  : 1985     DATE: 2023     Assessment and Plan:     Problem List Items Addressed This Visit        Other    Former smoker     Former smoker          Other Visit Diagnoses     Annual physical exam    -  Primary    Relevant Orders    Lipid panel    Comprehensive metabolic panel          Immunizations and preventive care screenings were discussed with patient today. Appropriate education was printed on patient's after visit summary. Discussed risks and benefits of prostate cancer screening. We discussed the controversial history of PSA screening for prostate cancer in the [facility] as well as the risk of over detection and over treatment of prostate cancer by way of PSA screening. The patient understands that PSA blood testing is an imperfect way to screen for prostate cancer and that elevated PSA levels in the blood may also be caused by infection, inflammation, prostatic trauma or manipulation, urological procedures, or by benign prostatic enlargement. The role of the digital rectal examination in prostate cancer screening was also discussed and I discussed with him that there is large interobserver variability in the findings of digital rectal examination. Counseling:  Alcohol/drug use: discussed moderation in alcohol intake, the recommendations for healthy alcohol use, and avoidance of illicit drug use. Dental Health: discussed importance of regular tooth brushing, flossing, and dental visits. Injury prevention: discussed safety/seat belts, safety helmets, smoke detectors, carbon dioxide detectors, and smoking near bedding or upholstery. Sexual health: discussed sexually transmitted diseases, partner selection, use of condoms, avoidance of unintended pregnancy, and contraceptive alternatives.   Exercise: the importance of regular exercise/physical activity was discussed. Recommend exercise 3-5 times per week for at least 30 minutes. Return in about 1 year (around 7/26/2024) for Annual exam PE. Chief Complaint:     Chief Complaint   Patient presents with   • Physical Exam     Patient in office for annual check up and to establish care      History of Present Illness:     Adult Annual Physical   Patient here for a comprehensive physical exam. The patient reports no problems. Patient has 2 kids. Diet and Physical Activity  Diet/Nutrition: well balanced diet. Exercise: no formal exercise. Depression Screening  PHQ-2/9 Depression Screening    Little interest or pleasure in doing things: 0 - not at all  Feeling down, depressed, or hopeless: 0 - not at all  PHQ-2 Score: 0  PHQ-2 Interpretation: Negative depression screen       General Health  Sleep: sleeps well. Hearing: normal - bilateral.  Vision: no vision problems. Dental: regular dental visits and brushes teeth once daily.  Health  Symptoms include: none     Review of Systems:     Review of Systems   Constitutional: Negative for chills and fever. HENT: Negative for ear pain and sore throat. Eyes: Negative for pain and visual disturbance. Respiratory: Negative for cough and shortness of breath. Cardiovascular: Negative for chest pain and palpitations. Gastrointestinal: Negative for abdominal pain and vomiting. Genitourinary: Negative for dysuria and hematuria. Musculoskeletal: Negative for arthralgias and back pain. Skin: Negative for color change and rash. Neurological: Negative for seizures and syncope. All other systems reviewed and are negative. Past Medical History:     History reviewed. No pertinent past medical history. Past Surgical History:     History reviewed. No pertinent surgical history.    Family History:     Family History   Problem Relation Age of Onset   • Kidney disease Mother    • Diabetes Paternal Uncle Social History:     Social History     Socioeconomic History   • Marital status: /Civil Union     Spouse name: None   • Number of children: None   • Years of education: None   • Highest education level: None   Occupational History   • None   Tobacco Use   • Smoking status: Former     Packs/day: 0.75     Types: Cigarettes     Quit date: 2022     Years since quittin.4     Passive exposure: Never   • Smokeless tobacco: Never   Vaping Use   • Vaping Use: Never used   Substance and Sexual Activity   • Alcohol use: Not Currently   • Drug use: Not Currently   • Sexual activity: None   Other Topics Concern   • None   Social History Narrative   • None     Social Determinants of Health     Financial Resource Strain: Not on file   Food Insecurity: Not on file   Transportation Needs: Not on file   Physical Activity: Not on file   Stress: Not on file   Social Connections: Not on file   Intimate Partner Violence: Not on file   Housing Stability: Not on file      Current Medications:     Current Outpatient Medications   Medication Sig Dispense Refill   • ibuprofen (MOTRIN) 800 mg tablet Take 1 tablet (800 mg total) by mouth in the morning and 1 tablet (800 mg total) in the evening and 1 tablet (800 mg total) before bedtime. (Patient not taking: Reported on 2023) 21 tablet 0   • methocarbamol (ROBAXIN) 500 mg tablet Take 1 tablet (500 mg total) by mouth in the morning and 1 tablet (500 mg total) in the evening. (Patient not taking: Reported on 2023) 20 tablet 0     No current facility-administered medications for this visit. Allergies:     No Known Allergies   Physical Exam:     /80 (BP Location: Left arm, Patient Position: Sitting, Cuff Size: Adult)   Pulse 63   Temp 98.1 °F (36.7 °C) (Temporal)   Resp 14   Ht 5' 7" (1.702 m)   Wt 63.4 kg (139 lb 12.8 oz)   BMI 21.90 kg/m²     Physical Exam  Vitals and nursing note reviewed.    Constitutional:       General: He is not in acute distress. Appearance: He is well-developed and normal weight. He is not ill-appearing, toxic-appearing or diaphoretic. HENT:      Head: Normocephalic and atraumatic. Right Ear: Tympanic membrane and external ear normal.      Left Ear: Tympanic membrane and external ear normal.      Nose: Nose normal.      Mouth/Throat:      Mouth: Mucous membranes are moist.      Pharynx: Uvula midline. No oropharyngeal exudate or posterior oropharyngeal erythema. Eyes:      General: No scleral icterus. Extraocular Movements: Extraocular movements intact. Conjunctiva/sclera: Conjunctivae normal.      Pupils: Pupils are equal, round, and reactive to light. Neck:      Thyroid: No thyromegaly. Vascular: No carotid bruit or JVD. Cardiovascular:      Rate and Rhythm: Normal rate and regular rhythm. Pulses:           Carotid pulses are 2+ on the right side and 2+ on the left side. Heart sounds: Normal heart sounds. Pulmonary:      Effort: Pulmonary effort is normal. No respiratory distress. Breath sounds: Normal breath sounds. Abdominal:      General: Bowel sounds are normal. There is no distension. Palpations: Abdomen is soft. Tenderness: There is no abdominal tenderness. Musculoskeletal:         General: Normal range of motion. Cervical back: Normal range of motion. Right lower leg: No edema. Left lower leg: No edema. Lymphadenopathy:      Cervical: No cervical adenopathy. Skin:     General: Skin is warm and dry. Capillary Refill: Capillary refill takes less than 2 seconds. Neurological:      Mental Status: He is alert and oriented to person, place, and time. Motor: Motor function is intact. Gait: Gait is intact. Gait normal.   Psychiatric:         Attention and Perception: Attention normal.         Mood and Affect: Mood normal.         Speech: Speech normal.         Behavior: Behavior normal. Behavior is cooperative.          Thought Content:  Thought content normal.         Judgment: Judgment normal.          SERGEI Guadarrama  [facility] PRIMARY CARE

## 2023-07-27 ENCOUNTER — TELEPHONE (OUTPATIENT)
Dept: ADMINISTRATIVE | Facility: OTHER | Age: 38
End: 2023-07-27

## 2023-07-27 NOTE — TELEPHONE ENCOUNTER
Upon review of the In Basket request we were able to locate, review, and update the patient chart as requested for Hepatitis C .HIV    Any additional questions or concerns should be emailed to the Practice Liaisons via the appropriate education email address, please do not reply via In Basket.     Thank you  Margoth Hobson

## 2023-07-27 NOTE — TELEPHONE ENCOUNTER
----- Message from Audelia Justice sent at 7/26/2023  2:45 PM EDT -----  Regarding: care gap request  07/26/23 2:45 PM    Hello, our patient attached above has had Hepatitis C completed/performed. Please assist in updating the patient chart by pulling the Care Everywhere (CE) document. The date of service is 1/10/22. Thank you,  [facility]     07/26/23 2:45 PM    Hello, our patient Jose Gordillo has had HIV completed/performed. Please assist in updating the patient chart by pulling the Care Everywhere (CE) document.  The date of service is 1/10/22  Thank you,  Audelia [facility] PR[facility]

## 2023-10-20 ENCOUNTER — HOSPITAL ENCOUNTER (EMERGENCY)
Facility: HOSPITAL | Age: 38
Discharge: HOME | End: 2023-10-20
Attending: EMERGENCY MEDICINE
Payer: COMMERCIAL

## 2023-10-20 VITALS
SYSTOLIC BLOOD PRESSURE: 121 MMHG | DIASTOLIC BLOOD PRESSURE: 90 MMHG | OXYGEN SATURATION: 98 % | TEMPERATURE: 98.2 F | RESPIRATION RATE: 16 BRPM | HEART RATE: 89 BPM

## 2023-10-20 DIAGNOSIS — K62.5 RECTAL BLEEDING: Primary | ICD-10-CM

## 2023-10-20 LAB
ALBUMIN SERPL BCP-MCNC: 4.4 G/DL (ref 3.5–5)
ALP SERPL-CCNC: 74 U/L (ref 34–104)
ALT SERPL W P-5'-P-CCNC: 17 U/L (ref 7–52)
ANION GAP SERPL CALCULATED.3IONS-SCNC: 6 MMOL/L
AST SERPL W P-5'-P-CCNC: 18 U/L (ref 13–39)
ATRIAL RATE: 75 BPM
BASOPHILS # BLD AUTO: 0.05 THOUSANDS/ÂΜL (ref 0–0.1)
BASOPHILS NFR BLD AUTO: 1 % (ref 0–1)
BILIRUB SERPL-MCNC: 0.65 MG/DL (ref 0.2–1)
BUN SERPL-MCNC: 16 MG/DL (ref 5–25)
CALCIUM SERPL-MCNC: 10 MG/DL (ref 8.4–10.2)
CHLORIDE SERPL-SCNC: 102 MMOL/L (ref 96–108)
CO2 SERPL-SCNC: 29 MMOL/L (ref 21–32)
CREAT SERPL-MCNC: 0.9 MG/DL (ref 0.6–1.3)
EOSINOPHIL # BLD AUTO: 0.17 THOUSAND/ÂΜL (ref 0–0.61)
EOSINOPHIL NFR BLD AUTO: 3 % (ref 0–6)
ERYTHROCYTE [DISTWIDTH] IN BLOOD BY AUTOMATED COUNT: 12 % (ref 11.6–15.1)
GFR SERPL CREATININE-BSD FRML MDRD: 109 ML/MIN/1.73SQ M
GLUCOSE SERPL-MCNC: 93 MG/DL (ref 65–140)
HCT VFR BLD AUTO: 44 % (ref 36.5–49.3)
HGB BLD-MCNC: 15.4 G/DL (ref 12–17)
IMM GRANULOCYTES # BLD AUTO: 0.01 THOUSAND/UL (ref 0–0.2)
IMM GRANULOCYTES NFR BLD AUTO: 0 % (ref 0–2)
LYMPHOCYTES # BLD AUTO: 2.29 THOUSANDS/ÂΜL (ref 0.6–4.47)
LYMPHOCYTES NFR BLD AUTO: 36 % (ref 14–44)
MCH RBC QN AUTO: 30 PG (ref 26.8–34.3)
MCHC RBC AUTO-ENTMCNC: 35 G/DL (ref 31.4–37.4)
MCV RBC AUTO: 86 FL (ref 82–98)
MONOCYTES # BLD AUTO: 0.63 THOUSAND/ÂΜL (ref 0.17–1.22)
MONOCYTES NFR BLD AUTO: 10 % (ref 4–12)
NEUTROPHILS # BLD AUTO: 3.26 THOUSANDS/ÂΜL (ref 1.85–7.62)
NEUTS SEG NFR BLD AUTO: 50 % (ref 43–75)
NRBC BLD AUTO-RTO: 0 /100 WBCS
P AXIS: 78 DEGREES
PLATELET # BLD AUTO: 272 THOUSANDS/UL (ref 149–390)
PMV BLD AUTO: 8.3 FL (ref 8.9–12.7)
POTASSIUM SERPL-SCNC: 4.2 MMOL/L (ref 3.5–5.3)
PR INTERVAL: 162 MS
PROT SERPL-MCNC: 7 G/DL (ref 6.4–8.4)
QRS AXIS: 28 DEGREES
QRSD INTERVAL: 90 MS
QT INTERVAL: 364 MS
QTC INTERVAL: 406 MS
RBC # BLD AUTO: 5.14 MILLION/UL (ref 3.88–5.62)
SODIUM SERPL-SCNC: 137 MMOL/L (ref 135–147)
T WAVE AXIS: 67 DEGREES
VENTRICULAR RATE: 75 BPM
WBC # BLD AUTO: 6.41 THOUSAND/UL (ref 4.31–10.16)

## 2023-10-20 PROCEDURE — 36415 COLL VENOUS BLD VENIPUNCTURE: CPT

## 2023-10-20 PROCEDURE — 99284 EMERGENCY DEPT VISIT MOD MDM: CPT

## 2023-10-20 PROCEDURE — 85025 COMPLETE CBC W/AUTO DIFF WBC: CPT

## 2023-10-20 PROCEDURE — 80053 COMPREHEN METABOLIC PANEL: CPT

## 2023-10-20 PROCEDURE — 93005 ELECTROCARDIOGRAM TRACING: CPT

## 2023-10-20 PROCEDURE — 93010 ELECTROCARDIOGRAM REPORT: CPT | Performed by: INTERNAL MEDICINE

## 2023-10-20 NOTE — ED ATTENDING ATTESTATION
10/20/2023  I, Vangie Luque MD, saw and evaluated the patient. I have discussed the patient with the resident/non-physician practitioner and agree with the resident's/non-physician practitioner's findings, Plan of Care, and MDM as documented in the resident's/non-physician practitioner's note, except where noted. All available labs and Radiology studies were reviewed. I was present for key portions of any procedure(s) performed by the resident/non-physician practitioner and I was immediately available to provide assistance. At this point I agree with the current assessment done in the Emergency Department. I have conducted an independent evaluation of this patient a history and physical is as follows:  Pt was constipated last week Pt with last 2 BMS has seen BRB. First BM 2 days ago had streak of blood . BM today had larger amount of blood no pain with BMs no fevers no abd pain PE: alert heart reg lungs clear abd soft mild tenderness ruq.  Rectal no ext hemorrhoid no fissure MDM; will treat symptomatically and refer to gi  ED Course         Critical Care Time  Procedures

## 2023-10-20 NOTE — DISCHARGE INSTRUCTIONS
You have been evaluated in the ED for rectal bleeding. Your evaluation is showing no signs of an acute, severe bleed requiring admission to the hospital. Please follow up with GI for a colonoscopy. Call 911 for any of the following: You have shortness of breath or trouble breathing. You faint or lose consciousness. You have chest pain. Return to the emergency department if:   You feel dizzy or are too weak to stand. Your heart is beating faster than usual.     You vomit blood, or your vomit looks like coffee grounds. You have blood in your bowel movement. You have abdominal pain or swelling.

## 2023-11-28 ENCOUNTER — OFFICE VISIT (OUTPATIENT)
Dept: FAMILY MEDICINE CLINIC | Facility: CLINIC | Age: 38
End: 2023-11-28
Payer: COMMERCIAL

## 2023-11-28 VITALS
DIASTOLIC BLOOD PRESSURE: 70 MMHG | OXYGEN SATURATION: 97 % | WEIGHT: 144 LBS | SYSTOLIC BLOOD PRESSURE: 110 MMHG | TEMPERATURE: 98.1 F | HEIGHT: 67 IN | BODY MASS INDEX: 22.6 KG/M2 | HEART RATE: 89 BPM

## 2023-11-28 DIAGNOSIS — Z20.822 SUSPECTED COVID-19 VIRUS INFECTION: ICD-10-CM

## 2023-11-28 DIAGNOSIS — R09.89 SUSPECTED NOVEL INFLUENZA A VIRUS INFECTION: Primary | ICD-10-CM

## 2023-11-28 LAB
SARS-COV-2 AG UPPER RESP QL IA: NEGATIVE
VALID CONTROL: NORMAL

## 2023-11-28 PROCEDURE — 87636 SARSCOV2 & INF A&B AMP PRB: CPT | Performed by: NURSE PRACTITIONER

## 2023-11-28 PROCEDURE — 87811 SARS-COV-2 COVID19 W/OPTIC: CPT | Performed by: NURSE PRACTITIONER

## 2023-11-28 PROCEDURE — 99214 OFFICE O/P EST MOD 30 MIN: CPT | Performed by: NURSE PRACTITIONER

## 2023-11-28 RX ORDER — METHYLPREDNISOLONE 4 MG/1
TABLET ORAL
Qty: 21 EACH | Refills: 0 | Status: SHIPPED | OUTPATIENT
Start: 2023-11-28

## 2023-11-28 NOTE — ASSESSMENT & PLAN NOTE
COVID/influenza PCR test was performed in the office today. Medrol Dosepak was ordered to help with congestion and throat pain. Patient was also advised to use Cepacol and salt water gargles as needed to help with throat discomfort.

## 2023-11-28 NOTE — PROGRESS NOTES
Name: Farrell Kussmaul      : 1985      MRN: [id number]  Encounter Provider: SERGEI Perez  Encounter Date: 2023   Encounter department: [place] [address]     1. Suspected novel influenza A virus infection  Assessment & Plan:  COVID/influenza PCR test was performed in the office today. Medrol Dosepak was ordered to help with congestion and throat pain. Patient was also advised to use Cepacol and salt water gargles as needed to help with throat discomfort. Orders:  -     methylPREDNISolone 4 MG tablet therapy pack; Use as directed on package  -     Covid/Flu- Office Collect    2. Suspected COVID-19 virus infection  -     POCT Rapid Covid Ag  -     methylPREDNISolone 4 MG tablet therapy pack; Use as directed on package        Depression Screening and Follow-up Plan: Patient was screened for depression during today's encounter. They screened negative with a PHQ-2 score of 0. Subjective      URI symptoms: Patient reports over the past 24 hours he has been experiencing symptoms of sore throat, rhinorrhea, nasal congestion, myalgias, increased fatigue, frontal sinus pressure and congestion, right ear otalgia, and dysphagia. Patient denies any fevers or shortness of breath. Rapid COVID test completed in the office today was negative. Review of Systems   Constitutional:  Positive for fatigue. Negative for chills and fever. HENT:  Positive for congestion, ear pain (right), postnasal drip, rhinorrhea, sinus pressure (frontal), sinus pain, sore throat and trouble swallowing (pain with swallowing). Eyes:  Negative for pain and visual disturbance. Respiratory:  Negative for cough, chest tightness, shortness of breath and wheezing. Cardiovascular:  Negative for chest pain, palpitations and leg swelling. Gastrointestinal:  Negative for abdominal pain, constipation, diarrhea, nausea and vomiting.    Endocrine: Negative for cold intolerance. Genitourinary:  Negative for decreased urine volume, dysuria and hematuria. Musculoskeletal:  Positive for myalgias. Negative for arthralgias and back pain. Skin:  Negative for color change and rash. Allergic/Immunologic: Negative for environmental allergies. Neurological:  Negative for dizziness, seizures, syncope, weakness, light-headedness, numbness and headaches. Hematological:  Negative for adenopathy. Psychiatric/Behavioral:  Negative for confusion. The patient is not nervous/anxious. All other systems reviewed and are negative. Current Outpatient Medications on File Prior to Visit   Medication Sig   • [DISCONTINUED] ibuprofen (MOTRIN) 800 mg tablet Take 1 tablet (800 mg total) by mouth in the morning and 1 tablet (800 mg total) in the evening and 1 tablet (800 mg total) before bedtime. (Patient not taking: Reported on 7/26/2023)   • [DISCONTINUED] methocarbamol (ROBAXIN) 500 mg tablet Take 1 tablet (500 mg total) by mouth in the morning and 1 tablet (500 mg total) in the evening. (Patient not taking: Reported on 7/26/2023)       Objective     /70 (BP Location: Left arm, Patient Position: Sitting, Cuff Size: Standard)   Pulse 89   Temp 98.1 °F (36.7 °C) (Tympanic)   Ht 5' 7" (1.702 m)   Wt 65.3 kg (144 lb)   SpO2 97%   BMI 22.55 kg/m²     Physical Exam  Vitals and nursing note reviewed. Constitutional:       General: He is not in acute distress. Appearance: Normal appearance. He is not ill-appearing. HENT:      Head: Normocephalic. Right Ear: Hearing normal. There is impacted cerumen. Left Ear: Hearing normal. There is impacted cerumen. Mouth/Throat:      Pharynx: Posterior oropharyngeal erythema present. No pharyngeal swelling, oropharyngeal exudate or uvula swelling. Tonsils: No tonsillar exudate or tonsillar abscesses.    Eyes:      Conjunctiva/sclera: Conjunctivae normal.   Cardiovascular:      Rate and Rhythm: Normal rate and regular rhythm. Pulses: Normal pulses. Carotid pulses are 2+ on the right side and 2+ on the left side. Radial pulses are 2+ on the right side and 2+ on the left side. Posterior tibial pulses are 2+ on the right side and 2+ on the left side. Heart sounds: Normal heart sounds. No murmur heard. Pulmonary:      Effort: Pulmonary effort is normal. No respiratory distress. Breath sounds: Normal breath sounds. No decreased breath sounds, wheezing, rhonchi or rales. Abdominal:      General: Abdomen is flat. Bowel sounds are normal. There is no distension. Palpations: Abdomen is soft. Tenderness: There is no abdominal tenderness. There is no guarding. Musculoskeletal:         General: Normal range of motion. Cervical back: Normal range of motion. Right lower leg: No edema. Left lower leg: No edema. Skin:     General: Skin is warm and dry. Capillary Refill: Capillary refill takes less than 2 seconds. Neurological:      General: No focal deficit present. Mental Status: He is alert and oriented to person, place, and time. Psychiatric:         Mood and Affect: Mood normal.         Behavior: Behavior normal.         Thought Content:  Thought content normal.         Judgment: Judgment normal.       SERGEI Robledo

## 2023-11-28 NOTE — LETTER
November 28, 2023     Patient: Jordana Salvador  YOB: 1985  Date of Visit: 11/28/2023      To Whom it May Concern:    Madison Tovar is under my professional care. Malaika Cortez was seen in my office on 11/28/2023. Malaika Cortez is excused for leaving work early on 11/28/2023 and is excused from work on 11/29/2023 as well as he is being treated for a medical condition. The patient may return to work on 11/30/2023. If you have any questions or concerns, please don't hesitate to call.          Sincerely,          SERGEI Russ        CC: No Recipients

## 2023-11-29 ENCOUNTER — TELEPHONE (OUTPATIENT)
Dept: FAMILY MEDICINE CLINIC | Facility: CLINIC | Age: 38
End: 2023-11-29

## 2023-11-29 LAB
FLUAV RNA RESP QL NAA+PROBE: NEGATIVE
FLUBV RNA RESP QL NAA+PROBE: NEGATIVE
SARS-COV-2 RNA RESP QL NAA+PROBE: NEGATIVE

## 2023-11-29 NOTE — TELEPHONE ENCOUNTER
Patient was given results and states he is feeling much better then before especially with medication !

## 2023-12-22 ENCOUNTER — OFFICE VISIT (OUTPATIENT)
Dept: FAMILY MEDICINE CLINIC | Facility: CLINIC | Age: 38
End: 2023-12-22
Payer: COMMERCIAL

## 2023-12-22 VITALS
WEIGHT: 145 LBS | HEART RATE: 68 BPM | DIASTOLIC BLOOD PRESSURE: 56 MMHG | SYSTOLIC BLOOD PRESSURE: 94 MMHG | HEIGHT: 67 IN | BODY MASS INDEX: 22.76 KG/M2 | OXYGEN SATURATION: 96 %

## 2023-12-22 DIAGNOSIS — Q63.1 HORSESHOE KIDNEY: Primary | ICD-10-CM

## 2023-12-22 LAB
BACTERIA UR QL AUTO: NORMAL /HPF
BILIRUB UR QL STRIP: NEGATIVE
CLARITY UR: CLEAR
COLOR UR: NORMAL
GLUCOSE UR STRIP-MCNC: NEGATIVE MG/DL
HGB UR QL STRIP.AUTO: NEGATIVE
KETONES UR STRIP-MCNC: NEGATIVE MG/DL
LEUKOCYTE ESTERASE UR QL STRIP: NEGATIVE
NITRITE UR QL STRIP: NEGATIVE
NON-SQ EPI CELLS URNS QL MICRO: NORMAL /HPF
PH UR STRIP.AUTO: 6 [PH]
PROT UR STRIP-MCNC: NEGATIVE MG/DL
RBC #/AREA URNS AUTO: NORMAL /HPF
SP GR UR STRIP.AUTO: 1.01 (ref 1–1.03)
UROBILINOGEN UR STRIP-ACNC: <2 MG/DL
WBC #/AREA URNS AUTO: NORMAL /HPF

## 2023-12-22 PROCEDURE — 99213 OFFICE O/P EST LOW 20 MIN: CPT | Performed by: NURSE PRACTITIONER

## 2023-12-22 PROCEDURE — 81001 URINALYSIS AUTO W/SCOPE: CPT | Performed by: NURSE PRACTITIONER

## 2023-12-22 NOTE — PROGRESS NOTES
"Name: Mykel Cano      : 1985      MRN: [id number]  Encounter Provider: SERGEI Guadarrama  Encounter Date: 2023   Encounter department: [facility]    Assessment & Plan     1. Horseshoe kidney  Assessment & Plan:  Recent cmp was normal. Will get UA microscopic completed to check for blood, protein and other potential abnormalities but reassured patient that there are no current signs of renal disease     Orders:  -     Urinalysis with microscopic           Subjective      Patient presents today he was concerned about his kidney function. He has one kidney and his mother recently passed away from kidney disease and dialysis.       Review of Systems   Constitutional: Negative.    Genitourinary:  Negative for decreased urine volume, difficulty urinating, dysuria and hematuria.       Current Outpatient Medications on File Prior to Visit   Medication Sig   • methylPREDNISolone 4 MG tablet therapy pack Use as directed on package (Patient not taking: Reported on 2023)       Objective     BP 94/56 (BP Location: Left arm, Patient Position: Sitting, Cuff Size: Standard)   Pulse 68   Ht 5' 7\" (1.702 m)   Wt 65.8 kg (145 lb)   SpO2 96%   BMI 22.71 kg/m²     Physical Exam  Vitals and nursing note reviewed.   Constitutional:       Appearance: Normal appearance. He is well-developed. He is not ill-appearing.   HENT:      Head: Normocephalic and atraumatic.   Eyes:      Extraocular Movements: Extraocular movements intact.      Conjunctiva/sclera: Conjunctivae normal.      Pupils: Pupils are equal.   Cardiovascular:      Rate and Rhythm: Normal rate and regular rhythm.      Heart sounds: S1 normal and S2 normal. No murmur heard.  Pulmonary:      Effort: Pulmonary effort is normal. No respiratory distress.      Breath sounds: Normal breath sounds.   Neurological:      Mental Status: He is alert and oriented to person, place, and time.   Psychiatric:         Mood and Affect: " Mood normal.         Behavior: Behavior normal.         Thought Content: Thought content normal.         Judgment: Judgment normal.       SERGEI Guadarrama

## 2023-12-22 NOTE — ASSESSMENT & PLAN NOTE
Recent cmp was normal. Will get UA microscopic completed to check for blood, protein and other potential abnormalities but reassured patient that there are no current signs of renal disease

## 2024-01-29 ENCOUNTER — TELEPHONE (OUTPATIENT)
Dept: GASTROENTEROLOGY | Facility: CLINIC | Age: 39
End: 2024-01-29

## 2024-01-29 ENCOUNTER — CONSULT (OUTPATIENT)
Dept: GASTROENTEROLOGY | Facility: CLINIC | Age: 39
End: 2024-01-29
Payer: COMMERCIAL

## 2024-01-29 ENCOUNTER — PREP FOR PROCEDURE (OUTPATIENT)
Dept: GASTROENTEROLOGY | Facility: CLINIC | Age: 39
End: 2024-01-29

## 2024-01-29 VITALS
HEIGHT: 67 IN | DIASTOLIC BLOOD PRESSURE: 68 MMHG | WEIGHT: 145 LBS | TEMPERATURE: 98.9 F | BODY MASS INDEX: 22.76 KG/M2 | SYSTOLIC BLOOD PRESSURE: 101 MMHG

## 2024-01-29 DIAGNOSIS — K62.5 RECTAL BLEEDING: Primary | ICD-10-CM

## 2024-01-29 DIAGNOSIS — K62.5 RECTAL BLEEDING: ICD-10-CM

## 2024-01-29 PROCEDURE — 99244 OFF/OP CNSLTJ NEW/EST MOD 40: CPT | Performed by: INTERNAL MEDICINE

## 2024-01-29 RX ORDER — BISACODYL 5 MG/1
10 TABLET, DELAYED RELEASE ORAL ONCE
Qty: 2 TABLET | Refills: 0 | Status: SHIPPED | OUTPATIENT
Start: 2024-01-29 | End: 2024-01-29

## 2024-01-29 NOTE — TELEPHONE ENCOUNTER
ASC Screening    ASC Screening  BMI > than 45: No  Are you currently pregnant?: No  Do you rely on a wheelchair for mobility?: No  Do you need oxygen during the day?: No  Have you ever been informed by anesthesia that you have a difficult airway?: No  Have you been diagnosed with End Stage Renal Disease (ESRD)?: No  Are you actively on dialysis?: No  Have you been diagnosed with Pulmonary Hypertension?: No  Do you have a pacemaker or an Automatic Implantable Cardioverter Defibrillator (AICD)?: No  Have you ever had an organ transplant?: No  Have you had a stroke, heart attack, myocardial infarction (MI) within the last 6 months?: No  Have you ever been diagnosed with Aortic Stenosis?: No  Have you ever been diagnosed  with Congestive Heart Failure?: No  Have you ever been diagnosed with a heart valve disease?: No  Are you Diabetic?: No  If you are Diabetic, has your A1C been greater than 12 within the last six months?: No

## 2024-01-29 NOTE — PATIENT INSTRUCTIONS
Scheduled date of colonoscopy (as of today):03/12/2024  Physician performing colonoscopy: JUNI  Location of colonoscopy: [facility] [place]  Bowel prep reviewed with patient:Levi Agrawal  Instructions reviewed with patient by: Polly  Clearances:  NONE    Patient will follow up if needed after procedure.

## 2024-01-29 NOTE — PROGRESS NOTES
[facility] - Outpatient Consultation  Mykel Cano 38 y.o. male MRN: [id number]  Encounter: [id number]          ASSESSMENT AND PLAN:      1. Rectal bleeding  Ambulatory Referral to Gastroenterology        Will proceed with colonoscopy to assess for rectal bleeding    The rationale for colonoscopy with possible biopsy, possible polypectomy, with IV sedation as well as all risks, benefits, and alternatives were discussed with the patient in detail. Risks including but not limited to perforation, bleeding, need for blood transfusion or emergent surgery, and missed neoplasm were reviewed in detail with the patient. The patient demonstrates full understanding and wishes to proceed with the colonoscopy.  ______________________________________________________________    HPI:  Mykel Cano is a 38 y.o. year old male who presents to the office for evaluation for rectal bleeding.  Had two episodes of blood in the stool about a couple months ago. He went to the ED in October as it was his second time having blood in the stool. No change in Hg (15.4). He had a CT in May 2022 for abdominal pain. I have personally viewed the images in PACS which was within normal limit except bulging of the lower 3 lumbar levels.    Reported symptoms: blood in stools  Family history: no known risk factors  Previous colonoscopy: none    No blood in stool. No weight loss.   No family history of colon cancer. No change in bowel habits.  They are not on blood thinners.    REVIEW OF SYSTEMS:    CONSTITUTIONAL: Denies any fever, chills, rigors, and weight loss.  HEENT: No earache or tinnitus. Denies hearing loss or visual disturbances.  CARDIOVASCULAR: No chest pain or palpitations.   RESPIRATORY: Denies any cough, hemoptysis, shortness of breath or dyspnea on exertion.  GASTROINTESTINAL: As noted in the History of Present Illness.   GENITOURINARY: No problems with urination. Denies any hematuria or  "dysuria.  NEUROLOGIC: No dizziness or vertigo, denies headaches.   MUSCULOSKELETAL: Denies any muscle or joint pain.   SKIN: Denies skin rashes or itching.   ENDOCRINE: Denies excessive thirst. Denies intolerance to heat or cold.  PSYCHOSOCIAL: Denies depression or anxiety. Denies any recent memory loss.       Historical Information   History reviewed. No pertinent past medical history.  History reviewed. No pertinent surgical history.  Social History   Social History     Substance and Sexual Activity   Alcohol Use Not Currently     Social History     Substance and Sexual Activity   Drug Use Not Currently     Social History     Tobacco Use   Smoking Status Former    Current packs/day: 0.00    Types: Cigarettes    Quit date: 2022    Years since quittin.9    Passive exposure: Never   Smokeless Tobacco Never     Family History   Problem Relation Age of Onset    Kidney disease Mother     Diabetes Paternal Uncle        Meds/Allergies       Current Outpatient Medications:     methylPREDNISolone 4 MG tablet therapy pack    No Known Allergies        Objective     Blood pressure 101/68, temperature 98.9 °F (37.2 °C), temperature source Tympanic, height 5' 7\" (1.702 m), weight 65.8 kg (145 lb). Body mass index is 22.71 kg/m².        PHYSICAL EXAM:      General Appearance:   Alert, cooperative, no distress   HEENT:   Normocephalic, atraumatic, anicteric.     Lungs:   Equal chest rise and unlabored breathing, normal cough   Heart:   No visualized JVD   Abdomen:   Soft, non-tender, non-distended; no masses, no organomegaly    Genitalia:   Deferred    Rectal:   Deferred    Extremities:  No cyanosis, clubbing or edema    Pulses:  Musculoskeletal:  2+ and symmetric  Normal range of motion visualized    Skin:  Neuro:  No jaundice, rashes, or lesions   Alert and appropriate       Lab Results:   No visits with results within 1 Day(s) from this visit.   Latest known visit with results is:   Office Visit on 2023 "   Component Date Value    Color, UA 12/22/2023 Light Yellow     Clarity, UA 12/22/2023 Clear     Specific Gravity, UA 12/22/2023 1.008     pH, UA 12/22/2023 6.0     Leukocytes, UA 12/22/2023 Negative     Nitrite, UA 12/22/2023 Negative     Protein, UA 12/22/2023 Negative     Glucose, UA 12/22/2023 Negative     Ketones, UA 12/22/2023 Negative     Urobilinogen, UA 12/22/2023 <2.0     Bilirubin, UA 12/22/2023 Negative     Occult Blood, UA 12/22/2023 Negative     RBC, UA 12/22/2023 1-2     WBC, UA 12/22/2023 None Seen     Epithelial Cells 12/22/2023 Occasional     Bacteria, UA 12/22/2023 None Seen          Radiology Results:   No results found.

## 2024-02-27 ENCOUNTER — ANESTHESIA (OUTPATIENT)
Dept: ANESTHESIOLOGY | Facility: HOSPITAL | Age: 39
End: 2024-02-27

## 2024-02-27 ENCOUNTER — ANESTHESIA EVENT (OUTPATIENT)
Dept: ANESTHESIOLOGY | Facility: HOSPITAL | Age: 39
End: 2024-02-27

## 2024-03-12 ENCOUNTER — ANESTHESIA (OUTPATIENT)
Dept: GASTROENTEROLOGY | Facility: AMBULARY SURGERY CENTER | Age: 39
End: 2024-03-12

## 2024-03-12 ENCOUNTER — HOSPITAL ENCOUNTER (OUTPATIENT)
Dept: GASTROENTEROLOGY | Facility: AMBULARY SURGERY CENTER | Age: 39
Setting detail: OUTPATIENT SURGERY
Discharge: HOME | End: 2024-03-12
Attending: INTERNAL MEDICINE
Payer: COMMERCIAL

## 2024-03-12 ENCOUNTER — ANESTHESIA EVENT (OUTPATIENT)
Dept: GASTROENTEROLOGY | Facility: AMBULARY SURGERY CENTER | Age: 39
End: 2024-03-12

## 2024-03-12 VITALS
WEIGHT: 135 LBS | BODY MASS INDEX: 21.19 KG/M2 | OXYGEN SATURATION: 97 % | RESPIRATION RATE: 16 BRPM | HEIGHT: 67 IN | DIASTOLIC BLOOD PRESSURE: 81 MMHG | TEMPERATURE: 97.2 F | SYSTOLIC BLOOD PRESSURE: 117 MMHG | HEART RATE: 73 BPM

## 2024-03-12 DIAGNOSIS — K62.5 RECTAL BLEEDING: ICD-10-CM

## 2024-03-12 RX ORDER — PROPOFOL 10 MG/ML
INJECTION, EMULSION INTRAVENOUS CONTINUOUS PRN
Status: DISCONTINUED | OUTPATIENT
Start: 2024-03-12 | End: 2024-03-12

## 2024-03-12 RX ORDER — SODIUM CHLORIDE, SODIUM LACTATE, POTASSIUM CHLORIDE, CALCIUM CHLORIDE 600; 310; 30; 20 MG/100ML; MG/100ML; MG/100ML; MG/100ML
INJECTION, SOLUTION INTRAVENOUS CONTINUOUS PRN
Status: DISCONTINUED | OUTPATIENT
Start: 2024-03-12 | End: 2024-03-12

## 2024-03-12 RX ORDER — LIDOCAINE HYDROCHLORIDE 20 MG/ML
INJECTION, SOLUTION EPIDURAL; INFILTRATION; INTRACAUDAL; PERINEURAL AS NEEDED
Status: DISCONTINUED | OUTPATIENT
Start: 2024-03-12 | End: 2024-03-12

## 2024-03-12 RX ORDER — ACETAMINOPHEN/DP-HYDRAMINE 500MG-38MG
TABLET ORAL
COMMUNITY

## 2024-03-12 RX ORDER — PROPOFOL 10 MG/ML
INJECTION, EMULSION INTRAVENOUS AS NEEDED
Status: DISCONTINUED | OUTPATIENT
Start: 2024-03-12 | End: 2024-03-12

## 2024-03-12 RX ADMIN — LIDOCAINE HYDROCHLORIDE 100 MG: 20 INJECTION, SOLUTION EPIDURAL; INFILTRATION; INTRACAUDAL; PERINEURAL at 10:55

## 2024-03-12 RX ADMIN — SODIUM CHLORIDE, SODIUM LACTATE, POTASSIUM CHLORIDE, AND CALCIUM CHLORIDE: .6; .31; .03; .02 INJECTION, SOLUTION INTRAVENOUS at 10:50

## 2024-03-12 RX ADMIN — PROPOFOL 50 MG: 10 INJECTION, EMULSION INTRAVENOUS at 10:56

## 2024-03-12 RX ADMIN — PROPOFOL 50 MG: 10 INJECTION, EMULSION INTRAVENOUS at 10:55

## 2024-03-12 RX ADMIN — Medication 40 MG: at 11:00

## 2024-03-12 RX ADMIN — PROPOFOL 80 MCG/KG/MIN: 10 INJECTION, EMULSION INTRAVENOUS at 10:55

## 2024-03-12 NOTE — H&P
History and Physical -  [facility]  Mykel Cano Jr. 38 y.o. male MRN: [id number]                  HPI: Mykel Cano Jr. is a 38 y.o. year old male who presents for colonoscopy.       REVIEW OF SYSTEMS: Per the HPI, and otherwise unremarkable.    Historical Information   No past medical history on file.  No past surgical history on file.  Social History   Social History     Substance and Sexual Activity   Alcohol Use Not Currently     Social History     Substance and Sexual Activity   Drug Use Not Currently     Social History     Tobacco Use   Smoking Status Former    Current packs/day: 0.00    Types: Cigarettes    Quit date: 2022    Years since quittin.1    Passive exposure: Never   Smokeless Tobacco Never     Family History   Problem Relation Age of Onset    Kidney disease Mother     Diabetes Paternal Uncle        Meds/Allergies       Current Outpatient Medications:     diphenhydrAMINE-APAP, sleep, (Excedrin PM)  MG TABS    methylPREDNISolone 4 MG tablet therapy pack    No Known Allergies    Objective     There were no vitals taken for this visit.      PHYSICAL EXAM    Gen: NAD  Head: NCAT  CV: RRR  CHEST: Clear  ABD: soft, NT/ND  EXT: no edema      ASSESSMENT/PLAN:  This is a 38 y.o. year old male here for colonoscopy, and he is stable and optimized for his procedure.

## 2024-03-12 NOTE — ANESTHESIA PREPROCEDURE EVALUATION
Procedure:  COLONOSCOPY    Relevant Problems   /RENAL   (+) Horseshoe kidney      NEURO/PSYCH   (+) Anxiety        Physical Exam    Airway    Mallampati score: I  TM Distance: >3 FB  Neck ROM: full     Dental       Cardiovascular  Cardiovascular exam normal    Pulmonary  Pulmonary exam normal     Other Findings        Anesthesia Plan  ASA Score- 2     Anesthesia Type- IV sedation with anesthesia with ASA Monitors.         Additional Monitors:     Airway Plan:            Plan Factors-Exercise tolerance (METS): >4 METS.    Chart reviewed. EKG reviewed. Imaging results reviewed. Existing labs reviewed. Patient summary reviewed.                  Induction- intravenous.    Postoperative Plan- Plan for postoperative opioid use. Planned trial extubation    Informed Consent- Anesthetic plan and risks discussed with patient.  I personally reviewed this patient with the CRNA. Discussed and agreed on the Anesthesia Plan with the CRNA..

## 2024-03-12 NOTE — ANESTHESIA POSTPROCEDURE EVALUATION
Post-Op Assessment Note    CV Status:  Stable  Pain Score: 0    Pain management: adequate       Mental Status:  Sleepy   Hydration Status:  Stable   PONV Controlled:  None   Airway Patency:  Patent  Two or more mitigation strategies used for obstructive sleep apnea   Post Op Vitals Reviewed: Yes    No anethesia notable event occurred.    Staff: TRAN               /65 (03/12/24 1111)    Temp (!) 97.2 °F (36.2 °C) (03/12/24 1111)    Pulse 63 (03/12/24 1111)   Resp 14 (03/12/24 1111)    SpO2 97 % (03/12/24 1111)

## 2024-08-19 PROBLEM — R09.89 SUSPECTED NOVEL INFLUENZA A VIRUS INFECTION: Status: RESOLVED | Noted: 2023-11-28 | Resolved: 2024-08-19

## 2025-01-31 ENCOUNTER — TELEPHONE (OUTPATIENT)
Age: 40
End: 2025-01-31

## 2025-01-31 DIAGNOSIS — Q63.1 HORSESHOE KIDNEY: Primary | ICD-10-CM

## 2025-01-31 DIAGNOSIS — Z00.00 ANNUAL PHYSICAL EXAM: ICD-10-CM

## 2025-01-31 NOTE — TELEPHONE ENCOUNTER
Please make sure to express to patient that discussing lab results if there are any abnormalities would mean that we are doing a follow up visit as well and he would be Responsible for his copay that day    Lab orders placed

## 2025-01-31 NOTE — ED PROVIDER NOTES
History  Chief Complaint   Patient presents with    Rectal Bleeding     Rectal bleeding for 2 days. Patient is a 61-year-old male without significant past medical history presenting with bright red blood per rectum. Patient has been having rectal bleeding with his last 2 bowel movements, noticed streaks of bright red blood. These bowel movements have been painless. Patient has noted recent constipation, and difficulty with bowel movements. Patient denies any other symptoms, review of systems is otherwise negative. No lightheadedness, fevers, nausea, vomiting. Prior to Admission Medications   Prescriptions Last Dose Informant Patient Reported? Taking?   ibuprofen (MOTRIN) 800 mg tablet  Self No No   Sig: Take 1 tablet (800 mg total) by mouth in the morning and 1 tablet (800 mg total) in the evening and 1 tablet (800 mg total) before bedtime. Patient not taking: Reported on 2023   methocarbamol (ROBAXIN) 500 mg tablet  Self No No   Sig: Take 1 tablet (500 mg total) by mouth in the morning and 1 tablet (500 mg total) in the evening. Patient not taking: Reported on 2023      Facility-Administered Medications: None       History reviewed. No pertinent past medical history. History reviewed. No pertinent surgical history. Family History   Problem Relation Age of Onset    Kidney disease Mother     Diabetes Paternal Uncle      I have reviewed and agree with the history as documented. E-Cigarette/Vaping    E-Cigarette Use Never User      E-Cigarette/Vaping Substances     Social History     Tobacco Use    Smoking status: Former     Packs/day: 0.75     Types: Cigarettes     Quit date: 2022     Years since quittin.7     Passive exposure: Never    Smokeless tobacco: Never   Vaping Use    Vaping Use: Never used   Substance Use Topics    Alcohol use: Not Currently    Drug use: Not Currently        Review of Systems   Constitutional:  Negative for chills and fever.    HENT:  Negative Recommendations:  -Low salt diet - less than 2g   -Exercise   -Check your blood pressure twice daily for 3-4 days then return those readings to me    Patient Education        DASH Diet: Care Instructions  Your Care Instructions     The DASH diet is an eating plan that can help lower your blood pressure. DASH stands for Dietary Approaches to Stop Hypertension. Hypertension is high blood pressure.  The DASH diet focuses on eating foods that are high in calcium, potassium, and magnesium. These nutrients can lower blood pressure. The foods that are highest in these nutrients are fruits, vegetables, low-fat dairy products, nuts, seeds, and legumes. But taking calcium, potassium, and magnesium supplements instead of eating foods that are high in those nutrients does not have the same effect. The DASH diet also includes whole grains, fish, and poultry.  The DASH diet is one of several lifestyle changes your doctor may recommend to lower your high blood pressure. Your doctor may also want you to decrease the amount of sodium in your diet. Lowering sodium while following the DASH diet can lower blood pressure even further than just the DASH diet alone.  Follow-up care is a key part of your treatment and safety. Be sure to make and go to all appointments, and call your doctor if you are having problems. It's also a good idea to know your test results and keep a list of the medicines you take.  How can you care for yourself at home?  Following the DASH diet  Eat 4 to 5 servings of fruit each day. A serving is 1 medium-sized piece of fruit, 1/2 cup raw or canned fruit, 1/4 cup dried fruit, or 4 ounces (1/2 cup) of fruit juice. Choose fruit more often than fruit juice.  Eat 4 to 5 servings of vegetables each day. A serving is 1 cup of lettuce or raw leafy vegetables, 1/2 cup of chopped or cooked vegetables, or 4 ounces (1/2 cup) of vegetable juice. Choose vegetables more often than vegetable juice.  Get 2 to 3 servings of  for ear pain and sore throat. Eyes:  Negative for pain and visual disturbance. Respiratory:  Negative for cough and shortness of breath. Cardiovascular:  Negative for chest pain and palpitations. Gastrointestinal:  Positive for blood in stool. Negative for abdominal pain and vomiting. Genitourinary:  Negative for dysuria and hematuria. Musculoskeletal:  Negative for arthralgias and back pain. Skin:  Negative for color change and rash. Neurological:  Negative for seizures and syncope. All other systems reviewed and are negative. Physical Exam  ED Triage Vitals [10/20/23 0930]   Temperature Pulse Respirations Blood Pressure SpO2   98.2 °F (36.8 °C) 89 16 121/90 98 %      Temp src Heart Rate Source Patient Position - Orthostatic VS BP Location FiO2 (%)   -- -- -- -- --      Pain Score       2             Orthostatic Vital Signs  Vitals:    10/20/23 0930   BP: 121/90   Pulse: 89       Physical Exam  Vitals and nursing note reviewed. Constitutional:       General: He is not in acute distress. Appearance: He is well-developed. HENT:      Head: Normocephalic and atraumatic. Eyes:      Conjunctiva/sclera: Conjunctivae normal.   Cardiovascular:      Rate and Rhythm: Normal rate and regular rhythm. Heart sounds: No murmur heard. Pulmonary:      Effort: Pulmonary effort is normal. No respiratory distress. Breath sounds: Normal breath sounds. Abdominal:      Palpations: Abdomen is soft. Tenderness: There is no abdominal tenderness. Genitourinary:     Comments: No hemorrhoids, tears, fissures  Positive Hemoccult  Musculoskeletal:         General: No swelling. Cervical back: Neck supple. Skin:     General: Skin is warm and dry. Capillary Refill: Capillary refill takes less than 2 seconds. Neurological:      Mental Status: He is alert.    Psychiatric:         Mood and Affect: Mood normal.         ED Medications  Medications - No data to display    Diagnostic Studies  Results Reviewed       Procedure Component Value Units Date/Time    Comprehensive metabolic panel [[id number]] Collected: 10/20/23 1105    Lab Status: Final result Specimen: Blood from Arm, Left Updated: 10/20/23 1140     Sodium 137 mmol/L      Potassium 4.2 mmol/L      Chloride 102 mmol/L      CO2 29 mmol/L      ANION GAP 6 mmol/L      BUN 16 mg/dL      Creatinine 0.90 mg/dL      Glucose 93 mg/dL      Calcium 10.0 mg/dL      AST 18 U/L      ALT 17 U/L      Alkaline Phosphatase 74 U/L      Total Protein 7.0 g/dL      Albumin 4.4 g/dL      Total Bilirubin 0.65 mg/dL      eGFR 109 ml/min/1.73sq m     Narrative:      National Kidney Disease Foundation guidelines for Chronic Kidney Disease (CKD):     Stage 1 with normal or high GFR (GFR > 90 mL/min/1.73 square meters)    Stage 2 Mild CKD (GFR = 60-89 mL/min/1.73 square meters)    Stage 3A Moderate CKD (GFR = 45-59 mL/min/1.73 square meters)    Stage 3B Moderate CKD (GFR = 30-44 mL/min/1.73 square meters)    Stage 4 Severe CKD (GFR = 15-29 mL/min/1.73 square meters)    Stage 5 End Stage CKD (GFR <15 mL/min/1.73 square meters)  Note: GFR calculation is accurate only with a steady state creatinine    CBC and differential [[id number]]  (Abnormal) Collected: 10/20/23 1105    Lab Status: Final result Specimen: Blood from Arm, Left Updated: 10/20/23 1125     WBC 6.41 Thousand/uL      RBC 5.14 Million/uL      Hemoglobin 15.4 g/dL      Hematocrit 44.0 %      MCV 86 fL      MCH 30.0 pg      MCHC 35.0 g/dL      RDW 12.0 %      MPV 8.3 fL      Platelets 516 Thousands/uL      nRBC 0 /100 WBCs      Neutrophils Relative 50 %      Immat GRANS % 0 %      Lymphocytes Relative 36 %      Monocytes Relative 10 %      Eosinophils Relative 3 %      Basophils Relative 1 %      Neutrophils Absolute 3.26 Thousands/µL      Immature Grans Absolute 0.01 Thousand/uL      Lymphocytes Absolute 2.29 Thousands/µL      Monocytes Absolute 0.63 Thousand/µL      Eosinophils Absolute 0.17 Thousand/µL Basophils Absolute 0.05 Thousands/µL                    No orders to display         Procedures  Procedures      ED Course                                       Medical Decision Making  Patient is a 16-year-old male presenting with 2 episodes of low volume bright red blood per rectum, without obvious external abnormalities on rectal examination. Differential diagnosis for painless rectal bleeding includes diverticulosis, hemorrhoids, AVM, malignancy. CT of low clinical value to evaluate for low velocity GI bleeding. Patient's vitals are stable, exam electrolyte abnormalities on laboratory analysis. Given patient's well appearance, stable vital signs, reassuring laboratory findings, patient will be discharged to follow-up with GI outpatient for colonoscopy for definitive diagnostics. Patient expressed understanding of this plan and agreed to follow-up with GI. Return precautions discussed, patient expressed understanding of the return precautions, all questions answered prior to discharge. Amount and/or Complexity of Data Reviewed  Labs: ordered. Disposition  Final diagnoses:   Rectal bleeding     Time reflects when diagnosis was documented in both MDM as applicable and the Disposition within this note       Time User Action Codes Description Comment    10/20/2023 11:47 AM Hannabill Santillan Add [K62.5] Rectal bleeding           ED Disposition       ED Disposition   Discharge    Condition   Stable    Date/Time   Fri Oct 20, 2023 11:47 AM    Comment   [address] [facility] discharge to home/self care.                    Follow-up Information       Follow up With Specialties Details Why Contact Info Additional [address] [facility]   [address]  [facility] [address] [facility] [address]  [facility] [place], [address], [address] [place] [address], [address] Discharge Medication List as of 10/20/2023 11:50 AM        CONTINUE these medications which have NOT CHANGED    Details   ibuprofen (MOTRIN) 800 mg tablet Take 1 tablet (800 mg total) by mouth in the morning and 1 tablet (800 mg total) in the evening and 1 tablet (800 mg total) before bedtime. , Starting Sat 5/21/2022, Print      methocarbamol (ROBAXIN) 500 mg tablet Take 1 tablet (500 mg total) by mouth in the morning and 1 tablet (500 mg total) in the evening., Starting Sat 5/21/2022, Print               PDMP Review       None             ED Provider  Attending physically available and evaluated [address] [place] [address]. I managed the patient along with the ED Attending.     Electronically Signed by           Bev Lizama MD  10/21/23 [address], [place]  10/21/23 0378

## 2025-01-31 NOTE — TELEPHONE ENCOUNTER
Patient called and scheduled a physical for 02/14/2025. Patient is asking if pcp would place routine lab work orders for him to have complete. Patient stated it has been some time since having his lab work done and he would like to discuss results with pcp at his upcoming visit. Please advise and return patient call to notify.

## 2025-02-03 PROCEDURE — 99284 EMERGENCY DEPT VISIT MOD MDM: CPT

## 2025-02-04 ENCOUNTER — HOSPITAL ENCOUNTER (EMERGENCY)
Facility: HOSPITAL | Age: 40
Discharge: HOME | End: 2025-02-04
Attending: EMERGENCY MEDICINE | Admitting: EMERGENCY MEDICINE
Payer: COMMERCIAL

## 2025-02-04 VITALS
OXYGEN SATURATION: 99 % | RESPIRATION RATE: 19 BRPM | TEMPERATURE: 97.6 F | SYSTOLIC BLOOD PRESSURE: 104 MMHG | HEART RATE: 86 BPM | DIASTOLIC BLOOD PRESSURE: 66 MMHG

## 2025-02-04 DIAGNOSIS — R10.9 ABDOMINAL PAIN: ICD-10-CM

## 2025-02-04 DIAGNOSIS — R11.2 NAUSEA AND VOMITING: Primary | ICD-10-CM

## 2025-02-04 LAB
ALBUMIN SERPL BCG-MCNC: 4.4 G/DL (ref 3.5–5)
ALP SERPL-CCNC: 59 U/L (ref 34–104)
ALT SERPL W P-5'-P-CCNC: 9 U/L (ref 7–52)
ANION GAP SERPL CALCULATED.3IONS-SCNC: 11 MMOL/L (ref 4–13)
AST SERPL W P-5'-P-CCNC: 13 U/L (ref 13–39)
BASOPHILS # BLD AUTO: 0.05 THOUSANDS/ΜL (ref 0–0.1)
BASOPHILS NFR BLD AUTO: 0 % (ref 0–1)
BILIRUB SERPL-MCNC: 0.81 MG/DL (ref 0.2–1)
BUN SERPL-MCNC: 17 MG/DL (ref 5–25)
CALCIUM SERPL-MCNC: 9 MG/DL (ref 8.4–10.2)
CHLORIDE SERPL-SCNC: 107 MMOL/L (ref 96–108)
CO2 SERPL-SCNC: 24 MMOL/L (ref 21–32)
CREAT SERPL-MCNC: 0.82 MG/DL (ref 0.6–1.3)
EOSINOPHIL # BLD AUTO: 0.15 THOUSAND/ΜL (ref 0–0.61)
EOSINOPHIL NFR BLD AUTO: 1 % (ref 0–6)
ERYTHROCYTE [DISTWIDTH] IN BLOOD BY AUTOMATED COUNT: 12.2 % (ref 11.6–15.1)
GFR SERPL CREATININE-BSD FRML MDRD: 112 ML/MIN/1.73SQ M
GLUCOSE SERPL-MCNC: 130 MG/DL (ref 65–140)
HCT VFR BLD AUTO: 43.2 % (ref 36.5–49.3)
HGB BLD-MCNC: 14.7 G/DL (ref 12–17)
IMM GRANULOCYTES # BLD AUTO: 0.04 THOUSAND/UL (ref 0–0.2)
IMM GRANULOCYTES NFR BLD AUTO: 0 % (ref 0–2)
LIPASE SERPL-CCNC: 25 U/L (ref 11–82)
LYMPHOCYTES # BLD AUTO: 0.44 THOUSANDS/ΜL (ref 0.6–4.47)
LYMPHOCYTES NFR BLD AUTO: 3 % (ref 14–44)
MCH RBC QN AUTO: 29.5 PG (ref 26.8–34.3)
MCHC RBC AUTO-ENTMCNC: 34 G/DL (ref 31.4–37.4)
MCV RBC AUTO: 87 FL (ref 82–98)
MONOCYTES # BLD AUTO: 0.91 THOUSAND/ΜL (ref 0.17–1.22)
MONOCYTES NFR BLD AUTO: 7 % (ref 4–12)
NEUTROPHILS # BLD AUTO: 11.22 THOUSANDS/ΜL (ref 1.85–7.62)
NEUTS SEG NFR BLD AUTO: 89 % (ref 43–75)
NRBC BLD AUTO-RTO: 0 /100 WBCS
PLATELET # BLD AUTO: 232 THOUSANDS/UL (ref 149–390)
PMV BLD AUTO: 8.3 FL (ref 8.9–12.7)
POTASSIUM SERPL-SCNC: 3.3 MMOL/L (ref 3.5–5.3)
PROT SERPL-MCNC: 6.6 G/DL (ref 6.4–8.4)
RBC # BLD AUTO: 4.99 MILLION/UL (ref 3.88–5.62)
SODIUM SERPL-SCNC: 142 MMOL/L (ref 135–147)
WBC # BLD AUTO: 12.81 THOUSAND/UL (ref 4.31–10.16)

## 2025-02-04 PROCEDURE — 83690 ASSAY OF LIPASE: CPT

## 2025-02-04 PROCEDURE — 96375 TX/PRO/DX INJ NEW DRUG ADDON: CPT

## 2025-02-04 PROCEDURE — 99284 EMERGENCY DEPT VISIT MOD MDM: CPT | Performed by: EMERGENCY MEDICINE

## 2025-02-04 PROCEDURE — 85025 COMPLETE CBC W/AUTO DIFF WBC: CPT

## 2025-02-04 PROCEDURE — 96374 THER/PROPH/DIAG INJ IV PUSH: CPT

## 2025-02-04 PROCEDURE — 36415 COLL VENOUS BLD VENIPUNCTURE: CPT

## 2025-02-04 PROCEDURE — 80053 COMPREHEN METABOLIC PANEL: CPT

## 2025-02-04 RX ORDER — DROPERIDOL 2.5 MG/ML
0.62 INJECTION, SOLUTION INTRAMUSCULAR; INTRAVENOUS ONCE
Status: COMPLETED | OUTPATIENT
Start: 2025-02-04 | End: 2025-02-04

## 2025-02-04 RX ORDER — ONDANSETRON 2 MG/ML
1 INJECTION INTRAMUSCULAR; INTRAVENOUS ONCE
Status: COMPLETED | OUTPATIENT
Start: 2025-02-04 | End: 2025-02-04

## 2025-02-04 RX ORDER — KETOROLAC TROMETHAMINE 30 MG/ML
15 INJECTION, SOLUTION INTRAMUSCULAR; INTRAVENOUS ONCE
Status: COMPLETED | OUTPATIENT
Start: 2025-02-04 | End: 2025-02-04

## 2025-02-04 RX ADMIN — DROPERIDOL 0.62 MG: 2.5 INJECTION, SOLUTION INTRAMUSCULAR; INTRAVENOUS at 00:31

## 2025-02-04 RX ADMIN — KETOROLAC TROMETHAMINE 15 MG: 30 INJECTION, SOLUTION INTRAMUSCULAR; INTRAVENOUS at 00:31

## 2025-02-04 NOTE — DISCHARGE INSTRUCTIONS
Patient Education     Nausea and Vomiting, Adult ED   General Information   You came to the Emergency Department (ED) for nausea and vomiting. When you feel sick to your stomach, this is nausea. When you throw up, this is vomiting. Often, nausea and vomiting are caused by a virus. But they can also be caused by more serious things like an infection around the brain. The staff felt the risk of a serious cause for your nausea and vomiting is low.  If a virus is causing your nausea and vomiting, it is easy to spread from person to person. You can lower this risk by washing your hands often. Most of the time, your symptoms will go away without treatment in a few days.  You may be waiting on some test results. The staff will contact you if there are concerning results.  What care is needed at home?   Call your regular doctor to let them know you were in the ED. Make a follow-up appointment if you were told to.  Drink small amounts of fluid every 15 to 30 minutes. Good fluids to drink are water, broth, and oral electrolyte solutions. Sugar-free or very low sugar sports drinks are also OK.  Once you feel you can eat, start with crackers, toast, or cereal. Then eat small amounts of food more often.  Wash your hands often. This will help keep others healthy.  Avoid alcohol and caffeine.  If you have diabetes, check your blood sugar more often than usual. Being sick can affect your blood sugar levels.  When do I need to get emergency help?   Call for an ambulance right away if:   You have vomiting along with a fever and severe headache or stiff neck.  You have vomiting along with severe chest or belly pain or trouble breathing.  You are vomiting large amounts of blood (more than 1 teaspoon or 5 mL).  Return to the ED if:   You have signs of severe fluid loss, such as:  No urine for more than 8 hours.  Feel very light-headed or like you are going to pass out.  Feel weak like you are going to fall.  Feel like your heart is  beating very fast.  You feel extremely weak, like you are going to pass out.  You are vomiting multiple times every hour.  When do I need to call the doctor?   You develop early signs of fluid loss, such as:  Dark-colored urine.  Dry mouth.  Muscle cramps.  Lack of energy.  Feeling light-headed when you get up.  You have a small amount of blood (less than 1 teaspoon or 5 mL) in your vomit or bowel movements.  You throw up something that looks like coffee grounds.  You have a bowel movement that is black and looks like tar.  You are not able to keep fluids down.  You have a fever of 100.4º F (38°C) or higher or chills that do not go away after a day.  You have new or worsening symptoms.  Last Reviewed Date   2021-05-21  Consumer Information Use and Disclaimer   This generalized information is a limited summary of diagnosis, treatment, and/or medication information. It is not meant to be comprehensive and should be used as a tool to help the user understand and/or assess potential diagnostic and treatment options. It does NOT include all information about conditions, treatments, medications, side effects, or risks that may apply to a specific patient. It is not intended to be medical advice or a substitute for the medical advice, diagnosis, or treatment of a health care provider based on the health care provider's examination and assessment of a patient’s specific and unique circumstances. Patients must speak with a health care provider for complete information about their health, medical questions, and treatment options, including any risks or benefits regarding use of medications. This information does not endorse any treatments or medications as safe, effective, or approved for treating a specific patient. [employer] and its affiliates disclaim any warranty or liability relating to this information or the use thereof. The use of this information is governed by the Terms of Use, available at  https://www.wolterskluwer.com/en/know/clinical-effectiveness-terms   Copyright   Copyright © 2024 [employer] and its affiliates and/or licensors. All rights reserved.  Patient Education     Abdominal Pain, Adult ED   General Information   You came to the Emergency Department (ED) for abdominal or belly pain. The doctor feels that the risk of a serious cause for your belly pain is low.  Many things can cause belly pain. Some are serious things like bleeding or an infection. Less serious things, like an upset stomach, can also cause belly pain.  The doctors may not be able to find all serious causes of belly pain the first time they see you. It is important that you follow up with your doctor. You may be waiting on some test results. The staff will notify you if there are concerning results.  What care is needed at home?   Call your regular doctor to let them know you were in the ED. Make a follow-up appointment if you were told to.  Keep a diary about your pain to help your doctor learn more about the cause. Write down the foods you eat to see if they may be the cause of your pain. Also write down what you were doing before and during the pain.  Eat small meals more often. Eat more fiber if hard stools are a problem.  Avoid foods or drinks that make your pain worse. Some people are bothered by:  Drinks that are fizzy or have caffeine.  Fried, greasy, or fatty foods.  Orange juice.  Milk or cheese can bother some people’s stomach as well.  When you have pain, you can:  Try to have a bowel movement.  Lie down and rest.  Avoid solid foods for a few hours. If you are hungry, try liquids like broth or water. When you feel better, try mild foods like rice, crackers, bananas, applesauce, or toast.  Don’t take over-the-counter medicines, such as antacids or laxatives, unless they are ordered.  Check with the doctor before you take any herbal medicines or supplements.  When do I need to get emergency help?   Call for an  ambulance right away if:   You have sudden severe belly pain, or the pain is constant.  You have trouble breathing or chest pain along with your belly pain.  You start throwing up blood or pass a lot of blood in your stool.  Your belly becomes very hard or swollen.  You get a fever 102.2°F (39°C) or higher or shaking chills.  Return to the ED if:   You have signs of severe fluid loss, such as:  No urine for more than 8 hours.  You feel very light-headed or like you are going to pass out.  You feel weak, like you are going to fall.  Your pain gets worse, comes more often or moves to one area of the belly  You have an upset stomach or throwing up that isn’t getting better and are having trouble keeping down food and drink.  Your stools are black or tar colored.  When do I need to call the doctor?   If the pain is not gone or getting better in 1 to 2 days.  You have a fever of 100.4°F (38°C) or higher, chills.  You develop early signs of fluid loss, such as:  Your urine is very dark colored.  Your mouth is dry.  You have muscle cramps.  You have a lack of energy.  You feel light-headed when you get up.  You have pain with passing urine or have blood in your urine.  Your stools have a small amount (less than 1 teaspoon or 5 mL) of blood in them.  You feel that something is not right in your belly.  You have new or worsening symptoms.  Last Reviewed Date   2021-05-07  Consumer Information Use and Disclaimer   This generalized information is a limited summary of diagnosis, treatment, and/or medication information. It is not meant to be comprehensive and should be used as a tool to help the user understand and/or assess potential diagnostic and treatment options. It does NOT include all information about conditions, treatments, medications, side effects, or risks that may apply to a specific patient. It is not intended to be medical advice or a substitute for the medical advice, diagnosis, or treatment of a health care  provider based on the health care provider's examination and assessment of a patient’s specific and unique circumstances. Patients must speak with a health care provider for complete information about their health, medical questions, and treatment options, including any risks or benefits regarding use of medications. This information does not endorse any treatments or medications as safe, effective, or approved for treating a specific patient. Up[employer], [employer] and its affiliates disclaim any warranty or liability relating to this information or the use thereof. The use of this information is governed by the Terms of Use, available at https://www.20lines.com/en/know/clinical-effectiveness-terms   Copyright   Copyright © 2024 [employer], [employer] and its affiliates and/or licensors. All rights reserved.

## 2025-02-04 NOTE — ED ATTENDING ATTESTATION
2/3/2025  IChele MD, saw and evaluated the patient. I have discussed the patient with the resident/non-physician practitioner and agree with the resident's/non-physician practitioner's findings, Plan of Care, and MDM as documented in the resident's/non-physician practitioner's note, except where noted. All available labs and Radiology studies were reviewed.  I was present for key portions of any procedure(s) performed by the resident/non-physician practitioner and I was immediately available to provide assistance.       At this point I agree with the current assessment done in the Emergency Department.  I have conducted an independent evaluation of this patient a history and physical is as follows:      Final Diagnosis:  1. Nausea and vomiting    2. Abdominal pain      Chief Complaint   Patient presents with    Abdominal Pain     Pt complains of abdominal pain and multiple vomiting/diarrheal episodes starting around 2000 today. Given zofran by EMS.           A:  -39-year-old male who presents with abdominal pain, nausea/vomiting/diarrhea.      P:  -Likely viral gastroenteritis.  Treat symptomatically with IV fluids and droperidol.  - given the presentation, will check CBC for marked leukocytosis  - CMP for liver enzyme elevation that could signal cholecystitis, biliary obstructive disease. Check RFTs for [facility] / markers of dehydration.  - Lipase given abdominal pain to evaluate specifically for pancreatitis.    - Disposition per workup.        H:    39-year-old male who presents with acute onset abdominal pain, nausea/vomiting, diarrhea.  Symptoms started around 8 PM.  No known sick contacts.  Patient actively vomiting on arrival.      PMH:  Past Medical History:   Diagnosis Date    Migraine        PSH:  History reviewed. No pertinent surgical history.      PE:   Vitals:    02/04/25 0012 02/04/25 0022   BP: 104/66    Pulse:  86   Resp: 19    Temp:  97.6 °F (36.4 °C)   TempSrc:  Oral   SpO2: 99%           Constitutional: Actively vomiting.  Pulmonary/Chest: Effort normal.   Abdominal: Normal appearance.   Neurological: He is alert.  Skin: Skin is warm, dry and intact.   Psychiatric: He has a normal mood and affect. His speech is normal and behavior is normal. Thought content normal.          - 13 point ROS was performed and all are normal unless stated in the history above.   - Nursing note reviewed. Vitals reviewed.   - Orders placed by myself and/or advanced practitioner / resident.    - Previous chart was reviewed  - No language barrier.   - History obtained from patient.   - There are no limitations to the history obtained. Reasons ROS could not be obtained:  N/A         Medications   ondansetron (FOR EMS ONLY) (ZOFRAN) 4 mg/2 mL injection 4 mg (0 mg Does not apply Given to EMS 2/4/25 0012)   droperidol (INAPSINE) injection 0.625 mg (0.625 mg Intravenous Given 2/4/25 0031)   ketorolac (TORADOL) injection 15 mg (15 mg Intravenous Given 2/4/25 0031)     No orders to display     Orders Placed This Encounter   Procedures    CBC and differential    Comprehensive metabolic panel    Lipase    UA w Reflex to Microscopic w Reflex to Culture     Labs Reviewed   CBC AND DIFFERENTIAL - Abnormal       Result Value Ref Range Status    WBC 12.81 (*) 4.31 - 10.16 Thousand/uL Final    RBC 4.99  3.88 - 5.62 Million/uL Final    Hemoglobin 14.7  12.0 - 17.0 g/dL Final    Hematocrit 43.2  36.5 - 49.3 % Final    MCV 87  82 - 98 fL Final    MCH 29.5  26.8 - 34.3 pg Final    MCHC 34.0  31.4 - 37.4 g/dL Final    RDW 12.2  11.6 - 15.1 % Final    MPV 8.3 (*) 8.9 - 12.7 fL Final    Platelets 232  149 - 390 Thousands/uL Final    nRBC 0  /100 WBCs Final    Segmented % 89 (*) 43 - 75 % Final    Immature Grans % 0  0 - 2 % Final    Lymphocytes % 3 (*) 14 - 44 % Final    Monocytes % 7  4 - 12 % Final    Eosinophils Relative 1  0 - 6 % Final    Basophils Relative 0  0 - 1 % Final    Absolute Neutrophils 11.22 (*) 1.85 - 7.62 Thousands/µL  Final    Absolute Immature Grans 0.04  0.00 - 0.20 Thousand/uL Final    Absolute Lymphocytes 0.44 (*) 0.60 - 4.47 Thousands/µL Final    Absolute Monocytes 0.91  0.17 - 1.22 Thousand/µL Final    Eosinophils Absolute 0.15  0.00 - 0.61 Thousand/µL Final    Basophils Absolute 0.05  0.00 - 0.10 Thousands/µL Final   COMPREHENSIVE METABOLIC PANEL - Abnormal    Sodium 142  135 - 147 mmol/L Final    Potassium 3.3 (*) 3.5 - 5.3 mmol/L Final    Chloride 107  96 - 108 mmol/L Final    CO2 24  21 - 32 mmol/L Final    ANION GAP 11  4 - 13 mmol/L Final    BUN 17  5 - 25 mg/dL Final    Creatinine 0.82  0.60 - 1.30 mg/dL Final    Comment: Standardized to IDMS reference method    Glucose 130  65 - 140 mg/dL Final    Comment: If the patient is fasting, the ADA then defines impaired fasting glucose as > 100 mg/dL and diabetes as > or equal to 123 mg/dL.    Calcium 9.0  8.4 - 10.2 mg/dL Final    AST 13  13 - 39 U/L Final    ALT 9  7 - 52 U/L Final    Comment: Specimen collection should occur prior to Sulfasalazine administration due to the potential for falsely depressed results.     Alkaline Phosphatase 59  34 - 104 U/L Final    Total Protein 6.6  6.4 - 8.4 g/dL Final    Albumin 4.4  3.5 - 5.0 g/dL Final    Total Bilirubin 0.81  0.20 - 1.00 mg/dL Final    Comment: Use of this assay is not recommended for patients undergoing treatment with eltrombopag due to the potential for falsely elevated results.  N-acetyl-p-benzoquinone imine (metabolite of Acetaminophen) will generate erroneously low results in samples for patients that have taken an overdose of Acetaminophen.    eGFR 112  ml/min/1.73sq m Final    Narrative:     National Kidney Disease Foundation guidelines for Chronic Kidney Disease (CKD):     Stage 1 with normal or high GFR (GFR > 90 mL/min/1.73 square meters)    Stage 2 Mild CKD (GFR = 60-89 mL/min/1.73 square meters)    Stage 3A Moderate CKD (GFR = 45-59 mL/min/1.73 square meters)    Stage 3B Moderate CKD (GFR = 30-44  "mL/min/1.73 square meters)    Stage 4 Severe CKD (GFR = 15-29 mL/min/1.73 square meters)    Stage 5 End Stage CKD (GFR <15 mL/min/1.73 square meters)  Note: GFR calculation is accurate only with a steady state creatinine   LIPASE - Normal    Lipase 25  11 - 82 u/L Final   UA W REFLEX TO MICROSCOPIC WITH REFLEX TO CULTURE     Time reflects when diagnosis was documented in both MDM as applicable and the Disposition within this note       Time User Action Codes Description Comment    2/4/2025  1:25 AM Malorie Avila Add [R11.2] Nausea and vomiting     2/4/2025  1:25 AM Malorie Avila Add [R10.9] Abdominal pain           ED Disposition       ED Disposition   Discharge    Condition   Stable    Date/Time   Tue Feb 4, 2025  1:25 AM    Comment   Mykel Cano Jr. discharge to home/self care.                   Follow-up Information       Follow up With Specialties Details Why Contact Info Additional Information    [facility], Nurse Practitioner Go in 1 day for reevaluation [address] [id number]  [phone]       [facility] Emergency Medicine Go to  If symptoms worsen [address] [place] [id number]  [phone] [facility], [address] [place] [address], [phone]   [phone]          Patient's Medications   Discharge Prescriptions    No medications on file     No discharge procedures on file.  Prior to Admission Medications   Prescriptions Last Dose Informant Patient Reported? Taking?   diphenhydrAMINE-APAP, sleep, (Excedrin PM)  MG TABS   Yes No   Sig: Take by mouth      Facility-Administered Medications: None       Portions of the record may have been created with voice recognition software. Occasional wrong word or \"sound a like\" substitutions may have occurred due to the inherent limitations of voice recognition software. Read the " chart carefully and recognize, using context, where substitutions have occurred.       ED Course         Critical Care Time  Procedures

## 2025-02-04 NOTE — ED PROVIDER NOTES
Time reflects when diagnosis was documented in both MDM as applicable and the Disposition within this note       Time User Action Codes Description Comment    2/4/2025  1:25 AM Malorie Avila Add [R11.2] Nausea and vomiting     2/4/2025  1:25 AM Malorie Avila Add [R10.9] Abdominal pain           ED Disposition       ED Disposition   Discharge    Condition   Stable    Date/Time   Tue Feb 4, 2025  1:25 AM    Comment   Mykel Cano JrMike discharge to home/self care.                   Assessment & Plan       Medical Decision Making  Mykel Cano Jr. is a 39 y.o. who presents with complaints of abdominal pain, nausea, vomiting, and diarrhea     Vital signs are HD stable, afebrile    Ddx: viral GI illness, food borne illness  Doubt acute intraabdominal pathology     Plan: symptomatic treatment with droperidol and toradol  Blood work as below WNL  Patient reports significant improvement in symptoms and requesting to go home  Tolerated PO intake of water  Supportive care instructions and return precautions provided  Recommend PCP follow up if symptoms persist   Patient understands and is agreeable to plan     Disposition: patient stable for discharge         Amount and/or Complexity of Data Reviewed  Labs: ordered.    Risk  Prescription drug management.             Medications   ondansetron (FOR EMS ONLY) (ZOFRAN) 4 mg/2 mL injection 4 mg (0 mg Does not apply Given to EMS 2/4/25 0012)   droperidol (INAPSINE) injection 0.625 mg (0.625 mg Intravenous Given 2/4/25 0031)   ketorolac (TORADOL) injection 15 mg (15 mg Intravenous Given 2/4/25 0031)       ED Risk Strat Scores                          SBIRT 22yo+      Flowsheet Row Most Recent Value   Initial Alcohol Screen: US AUDIT-C     1. How often do you have a drink containing alcohol? 0 Filed at: 02/04/2025 0006   2. How many drinks containing alcohol do you have on a typical day you are drinking?  0 Filed at: 02/04/2025 0006   3a. Male UNDER 65: How often  do you have five or more drinks on one occasion? 0 Filed at: 02/04/2025 0006   3b. FEMALE Any Age, or MALE 65+: How often do you have 4 or more drinks on one occassion? 0 Filed at: 02/04/2025 0006   Audit-C Score 0 Filed at: 02/04/2025 0006   JEAN: How many times in the past year have you...    Used an illegal drug or used a prescription medication for non-medical reasons? Never Filed at: 02/04/2025 0006                            History of Present Illness       Chief Complaint   Patient presents with    Abdominal Pain     Pt complains of abdominal pain and multiple vomiting/diarrheal episodes starting around 2000 today. Given zofran by EMS.       Past Medical History:   Diagnosis Date    Migraine       History reviewed. No pertinent surgical history.   Family History   Problem Relation Age of Onset    Kidney disease Mother     Diabetes Paternal Uncle       Social History     Tobacco Use    Smoking status: Former     Current packs/day: 0.00     Types: Cigarettes     Quit date: 2/1/2022     Years since quitting: 3.0     Passive exposure: Never    Smokeless tobacco: Never   Vaping Use    Vaping status: Never Used   Substance Use Topics    Alcohol use: Not Currently    Drug use: Not Currently      E-Cigarette/Vaping    E-Cigarette Use Never User       E-Cigarette/Vaping Substances      I have reviewed and agree with the history as documented.     Patient is a 39-year-old male with pertinent past medical history of horseshoe kidney who presents for evaluation of generalized abdominal pain, nausea, nonbilious, nonbloody vomiting, and watery, nonbloody diarrhea.  Patient states that symptoms started approximately 2 hours ago.  No medications prior to arrival.  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   He felt well today prior to onset of symptoms and has been eating and drinking regularly.  Denies headache, fever, chills, chest  pain, shortness of breath, dizziness, lightheadedness, flank pain, dysuria, or hematuria.  Denies history of abdominal or pelvic surgery.         Review of Systems   All other systems reviewed and are negative.          Objective       ED Triage Vitals   Temperature Pulse Blood Pressure Respirations SpO2 Patient Position - Orthostatic VS   02/04/25 0022 02/04/25 0022 02/04/25 0012 02/04/25 0012 02/04/25 0012 --   97.6 °F (36.4 °C) 86 104/66 19 99 %       Temp Source Heart Rate Source BP Location FiO2 (%) Pain Score    02/04/25 0022 02/04/25 0022 -- -- --    Oral Right;Radial         Vitals      Date and Time Temp Pulse SpO2 Resp BP Pain Score FACES Pain Rating User   02/04/25 0022 97.6 °F (36.4 °C) 86 -- -- -- -- -- CM   02/04/25 0012 -- -- 99 % 19 104/66 -- -- CM            Physical Exam  Constitutional:       Appearance: He is normal weight. He is not toxic-appearing or diaphoretic.      Comments: Occasional retching and dry-heaving     HENT:      Head: Normocephalic and atraumatic.      Mouth/Throat:      Mouth: Mucous membranes are moist.   Eyes:      Extraocular Movements: Extraocular movements intact.      Conjunctiva/sclera: Conjunctivae normal.   Cardiovascular:      Rate and Rhythm: Normal rate and regular rhythm.      Heart sounds: Normal heart sounds.   Pulmonary:      Effort: Pulmonary effort is normal.      Breath sounds: Normal breath sounds.   Abdominal:      General: Abdomen is flat. There is no distension.      Palpations: Abdomen is soft. There is no mass.      Tenderness: There is abdominal tenderness (generalized). There is no guarding or rebound.      Hernia: No hernia is present.   Musculoskeletal:         General: Normal range of motion.      Cervical back: Normal range of motion and neck supple.   Skin:     General: Skin is warm and dry.      Coloration: Skin is not jaundiced.   Neurological:      Mental Status: He is oriented to person, place, and time.   Psychiatric:      Comments:  Anxious           Results Reviewed       Procedure Component Value Units Date/Time    Comprehensive metabolic panel [[id number]]  (Abnormal) Collected: 02/04/25 0041    Lab Status: Final result Specimen: Blood from Arm, Left Updated: 02/04/25 0112     Sodium 142 mmol/L      Potassium 3.3 mmol/L      Chloride 107 mmol/L      CO2 24 mmol/L      ANION GAP 11 mmol/L      BUN 17 mg/dL      Creatinine 0.82 mg/dL      Glucose 130 mg/dL      Calcium 9.0 mg/dL      AST 13 U/L      ALT 9 U/L      Alkaline Phosphatase 59 U/L      Total Protein 6.6 g/dL      Albumin 4.4 g/dL      Total Bilirubin 0.81 mg/dL      eGFR 112 ml/min/1.73sq m     Narrative:      National Kidney Disease Foundation guidelines for Chronic Kidney Disease (CKD):     Stage 1 with normal or high GFR (GFR > 90 mL/min/1.73 square meters)    Stage 2 Mild CKD (GFR = 60-89 mL/min/1.73 square meters)    Stage 3A Moderate CKD (GFR = 45-59 mL/min/1.73 square meters)    Stage 3B Moderate CKD (GFR = 30-44 mL/min/1.73 square meters)    Stage 4 Severe CKD (GFR = 15-29 mL/min/1.73 square meters)    Stage 5 End Stage CKD (GFR <15 mL/min/1.73 square meters)  Note: GFR calculation is accurate only with a steady state creatinine    Lipase [[id number]]  (Normal) Collected: 02/04/25 0041    Lab Status: Final result Specimen: Blood from Arm, Left Updated: 02/04/25 0112     Lipase 25 u/L     CBC and differential [[id number]]  (Abnormal) Collected: 02/04/25 0041    Lab Status: Final result Specimen: Blood from Arm, Left Updated: 02/04/25 0101     WBC 12.81 Thousand/uL      RBC 4.99 Million/uL      Hemoglobin 14.7 g/dL      Hematocrit 43.2 %      MCV 87 fL      MCH 29.5 pg      MCHC 34.0 g/dL      RDW 12.2 %      MPV 8.3 fL      Platelets 232 Thousands/uL      nRBC 0 /100 WBCs      Segmented % 89 %      Immature Grans % 0 %      Lymphocytes % 3 %      Monocytes % 7 %      Eosinophils Relative 1 %      Basophils Relative 0 %      Absolute Neutrophils 11.22 Thousands/µL      Absolute  Immature Grans 0.04 Thousand/uL      Absolute Lymphocytes 0.44 Thousands/µL      Absolute Monocytes 0.91 Thousand/µL      Eosinophils Absolute 0.15 Thousand/µL      Basophils Absolute 0.05 Thousands/µL     UA w Reflex to Microscopic w Reflex to Culture [[id number]]     Lab Status: No result Specimen: Urine             No orders to display       Procedures    ED Medication and Procedure Management   Prior to Admission Medications   Prescriptions Last Dose Informant Patient Reported? Taking?   diphenhydrAMINE-APAP, sleep, (Excedrin PM)  MG TABS   Yes No   Sig: Take by mouth      Facility-Administered Medications: None     Discharge Medication List as of 2/4/2025  1:25 AM        CONTINUE these medications which have NOT CHANGED    Details   diphenhydrAMINE-APAP, sleep, (Excedrin PM)  MG TABS Take by mouth, Historical Med           No discharge procedures on file.  ED SEPSIS DOCUMENTATION   Time reflects when diagnosis was documented in both MDM as applicable and the Disposition within this note       Time User Action Codes Description Comment    2/4/2025  1:25 AM Malorie Avila Add [R11.2] Nausea and vomiting     2/4/2025  1:25 AM Malorie Avila Add [R10.9] Abdominal pain                  Malorie Aivla MD  02/04/25 0231

## 2025-02-05 ENCOUNTER — OFFICE VISIT (OUTPATIENT)
Dept: FAMILY MEDICINE CLINIC | Facility: CLINIC | Age: 40
End: 2025-02-05
Payer: COMMERCIAL

## 2025-02-05 VITALS
OXYGEN SATURATION: 96 % | HEART RATE: 68 BPM | SYSTOLIC BLOOD PRESSURE: 110 MMHG | HEIGHT: 67 IN | DIASTOLIC BLOOD PRESSURE: 80 MMHG | BODY MASS INDEX: 18.83 KG/M2 | TEMPERATURE: 97.4 F | WEIGHT: 120 LBS

## 2025-02-05 DIAGNOSIS — A08.4 VIRAL GASTROENTERITIS: Primary | ICD-10-CM

## 2025-02-05 DIAGNOSIS — E87.6 HYPOKALEMIA: ICD-10-CM

## 2025-02-05 PROCEDURE — 99214 OFFICE O/P EST MOD 30 MIN: CPT | Performed by: NURSE PRACTITIONER

## 2025-02-05 RX ORDER — ONDANSETRON 4 MG/1
4 TABLET, ORALLY DISINTEGRATING ORAL EVERY 6 HOURS PRN
Qty: 30 TABLET | Refills: 0 | Status: SHIPPED | OUTPATIENT
Start: 2025-02-05

## 2025-02-05 NOTE — ASSESSMENT & PLAN NOTE
Patient's symptoms are consistent with continued viral gastroenteritis.  Overall, his symptoms do seem to be improving at this point.  He was prescribed Zofran 4 mg to be used every 6 hours as needed for nausea.  He was also advised to continue to follow the brat diet for nutrition and to continue to increase his fluid intake to prevent dehydration.  Orders:  •  ondansetron (ZOFRAN-ODT) 4 mg disintegrating tablet; Take 1 tablet (4 mg total) by mouth every 6 (six) hours as needed for nausea or vomiting

## 2025-02-05 NOTE — PROGRESS NOTES
Name: Mykel Cano Jr.      : 1985      MRN: [id number]  Encounter Provider: SERGEI Marin  Encounter Date: 2025   [facility]  :  Assessment & Plan  Viral gastroenteritis  Patient's symptoms are consistent with continued viral gastroenteritis.  Overall, his symptoms do seem to be improving at this point.  He was prescribed Zofran 4 mg to be used every 6 hours as needed for nausea.  He was also advised to continue to follow the brat diet for nutrition and to continue to increase his fluid intake to prevent dehydration.  Orders:  •  ondansetron (ZOFRAN-ODT) 4 mg disintegrating tablet; Take 1 tablet (4 mg total) by mouth every 6 (six) hours as needed for nausea or vomiting    Hypokalemia  Patient was advised that he can alternate between water and an electrolyte replacement solution such as Pedialyte or sugar-free Gatorade to help with hypokalemia.  This can be reassessed in the future.             Depression Screening and Follow-up Plan: Patient was screened for depression during today's encounter. They screened negative with a PHQ-2 score of 0.      History of Present Illness   Viral gastroenteritis: Patient was seen in the ED yesterday for nausea, vomiting, and diarrhea.  Patient did have a CBC with differential completed which did show a leukocytosis.  He was also noted to be slightly hypokalemic with a potassium level of 3.3 but otherwise his blood work was normal.  No imaging was completed at that time.  It was felt that the patient was likely suffering from viral gastroenteritis and he was discharged to home with supportive care.  Patient is reporting continued symptoms of diarrhea, nausea, and headaches.      Review of Systems   Constitutional:  Negative for chills and fever.   HENT:  Negative for ear pain and sore throat.    Eyes:  Negative for pain and visual disturbance.   Respiratory:  Negative for cough, chest tightness, shortness of  "breath and wheezing.    Cardiovascular:  Negative for chest pain, palpitations and leg swelling.   Gastrointestinal:  Positive for diarrhea and nausea. Negative for abdominal distention, abdominal pain, anal bleeding, blood in stool, constipation, rectal pain and vomiting.   Endocrine: Negative for cold intolerance and heat intolerance.   Genitourinary:  Negative for decreased urine volume, dysuria and hematuria.   Musculoskeletal:  Negative for arthralgias, back pain and myalgias.   Skin:  Negative for color change and rash.   Allergic/Immunologic: Negative for environmental allergies.   Neurological:  Positive for headaches. Negative for dizziness, seizures, syncope, weakness, light-headedness and numbness.   Hematological:  Negative for adenopathy.   Psychiatric/Behavioral:  Negative for confusion. The patient is not nervous/anxious.    All other systems reviewed and are negative.      Objective   /80 (BP Location: Left arm, Patient Position: Sitting, Cuff Size: Standard)   Pulse 68   Temp (!) 97.4 °F (36.3 °C) (Tympanic)   Ht 5' 7\" (1.702 m)   Wt 54.4 kg (120 lb)   SpO2 96%   BMI 18.79 kg/m²      Physical Exam  Vitals and nursing note reviewed.   Constitutional:       General: He is not in acute distress.     Appearance: Normal appearance. He is well-developed. He is not ill-appearing.   HENT:      Head: Normocephalic.      Right Ear: Hearing normal. There is impacted cerumen.      Left Ear: Hearing normal. There is impacted cerumen.   Eyes:      Conjunctiva/sclera: Conjunctivae normal.   Cardiovascular:      Rate and Rhythm: Normal rate and regular rhythm.      Pulses: Normal pulses.           Carotid pulses are 2+ on the right side and 2+ on the left side.       Radial pulses are 2+ on the right side and 2+ on the left side.        Posterior tibial pulses are 2+ on the right side and 2+ on the left side.      Heart sounds: Normal heart sounds. No murmur heard.  Pulmonary:      Effort: Pulmonary " effort is normal. No respiratory distress.      Breath sounds: Normal breath sounds. No decreased breath sounds, wheezing, rhonchi or rales.   Abdominal:      General: Abdomen is flat. Bowel sounds are increased. There is no distension.      Palpations: Abdomen is soft.      Tenderness: There is generalized abdominal tenderness. There is no guarding.   Musculoskeletal:         General: No swelling. Normal range of motion.      Cervical back: Normal range of motion and neck supple.      Right lower leg: No edema.      Left lower leg: No edema.   Skin:     General: Skin is warm and dry.      Capillary Refill: Capillary refill takes less than 2 seconds.   Neurological:      General: No focal deficit present.      Mental Status: He is alert and oriented to person, place, and time.   Psychiatric:         Mood and Affect: Mood normal.         Behavior: Behavior normal.         Thought Content: Thought content normal.         Judgment: Judgment normal.

## 2025-02-05 NOTE — ASSESSMENT & PLAN NOTE
Patient was advised that he can alternate between water and an electrolyte replacement solution such as Pedialyte or sugar-free Gatorade to help with hypokalemia.  This can be reassessed in the future.

## 2025-02-05 NOTE — LETTER
February 5, 2025     Patient: Mykel Cano .  YOB: 1985  Date of Visit: 2/5/2025      To Whom it May Concern:    Mykel Cano is under my professional care. Mykel was seen in my office on 2/5/2025. Mykel is excused from work from 2/4/2025-2/6/2025 as he is being treated for a medical condition.  He may return to work on 2/7/2025.    If you have any questions or concerns, please don't hesitate to call.         Sincerely,          SERGEI Marin        CC: No Recipients

## 2025-02-11 ENCOUNTER — APPOINTMENT (OUTPATIENT)
Dept: LAB | Facility: CLINIC | Age: 40
End: 2025-02-11
Payer: COMMERCIAL

## 2025-02-11 DIAGNOSIS — Z00.00 ANNUAL PHYSICAL EXAM: ICD-10-CM

## 2025-02-11 LAB
ALBUMIN SERPL BCG-MCNC: 4.6 G/DL (ref 3.5–5)
ALP SERPL-CCNC: 50 U/L (ref 34–104)
ALT SERPL W P-5'-P-CCNC: 18 U/L (ref 7–52)
AMORPH URATE CRY URNS QL MICRO: NORMAL
ANION GAP SERPL CALCULATED.3IONS-SCNC: 9 MMOL/L (ref 4–13)
AST SERPL W P-5'-P-CCNC: 16 U/L (ref 13–39)
BACTERIA UR QL AUTO: NORMAL /HPF
BASOPHILS # BLD AUTO: 0.05 THOUSANDS/ΜL (ref 0–0.1)
BASOPHILS NFR BLD AUTO: 1 % (ref 0–1)
BILIRUB SERPL-MCNC: 0.48 MG/DL (ref 0.2–1)
BILIRUB UR QL STRIP: NEGATIVE
BUN SERPL-MCNC: 13 MG/DL (ref 5–25)
CALCIUM SERPL-MCNC: 10 MG/DL (ref 8.4–10.2)
CHLORIDE SERPL-SCNC: 103 MMOL/L (ref 96–108)
CHOLEST SERPL-MCNC: 153 MG/DL (ref ?–200)
CLARITY UR: CLEAR
CO2 SERPL-SCNC: 26 MMOL/L (ref 21–32)
COLOR UR: NORMAL
CREAT SERPL-MCNC: 0.83 MG/DL (ref 0.6–1.3)
EOSINOPHIL # BLD AUTO: 0.16 THOUSAND/ΜL (ref 0–0.61)
EOSINOPHIL NFR BLD AUTO: 2 % (ref 0–6)
ERYTHROCYTE [DISTWIDTH] IN BLOOD BY AUTOMATED COUNT: 12.3 % (ref 11.6–15.1)
GFR SERPL CREATININE-BSD FRML MDRD: 112 ML/MIN/1.73SQ M
GLUCOSE P FAST SERPL-MCNC: 92 MG/DL (ref 65–99)
GLUCOSE UR STRIP-MCNC: NEGATIVE MG/DL
HCT VFR BLD AUTO: 44 % (ref 36.5–49.3)
HDLC SERPL-MCNC: 48 MG/DL
HGB BLD-MCNC: 14.8 G/DL (ref 12–17)
HGB UR QL STRIP.AUTO: NEGATIVE
IMM GRANULOCYTES # BLD AUTO: 0.03 THOUSAND/UL (ref 0–0.2)
IMM GRANULOCYTES NFR BLD AUTO: 0 % (ref 0–2)
KETONES UR STRIP-MCNC: NEGATIVE MG/DL
LDLC SERPL CALC-MCNC: 91 MG/DL (ref 0–100)
LEUKOCYTE ESTERASE UR QL STRIP: NEGATIVE
LYMPHOCYTES # BLD AUTO: 1.93 THOUSANDS/ΜL (ref 0.6–4.47)
LYMPHOCYTES NFR BLD AUTO: 28 % (ref 14–44)
MCH RBC QN AUTO: 29 PG (ref 26.8–34.3)
MCHC RBC AUTO-ENTMCNC: 33.6 G/DL (ref 31.4–37.4)
MCV RBC AUTO: 86 FL (ref 82–98)
MONOCYTES # BLD AUTO: 0.48 THOUSAND/ΜL (ref 0.17–1.22)
MONOCYTES NFR BLD AUTO: 7 % (ref 4–12)
NEUTROPHILS # BLD AUTO: 4.31 THOUSANDS/ΜL (ref 1.85–7.62)
NEUTS SEG NFR BLD AUTO: 62 % (ref 43–75)
NITRITE UR QL STRIP: NEGATIVE
NON-SQ EPI CELLS URNS QL MICRO: NORMAL /HPF
NRBC BLD AUTO-RTO: 0 /100 WBCS
PH UR STRIP.AUTO: 7 [PH]
PLATELET # BLD AUTO: 294 THOUSANDS/UL (ref 149–390)
PMV BLD AUTO: 8.7 FL (ref 8.9–12.7)
POTASSIUM SERPL-SCNC: 4 MMOL/L (ref 3.5–5.3)
PROT SERPL-MCNC: 7.2 G/DL (ref 6.4–8.4)
PROT UR STRIP-MCNC: NEGATIVE MG/DL
RBC # BLD AUTO: 5.11 MILLION/UL (ref 3.88–5.62)
RBC #/AREA URNS AUTO: NORMAL /HPF
SODIUM SERPL-SCNC: 138 MMOL/L (ref 135–147)
SP GR UR STRIP.AUTO: 1.01 (ref 1–1.03)
TRIGL SERPL-MCNC: 71 MG/DL (ref ?–150)
TSH SERPL DL<=0.05 MIU/L-ACNC: 0.66 UIU/ML (ref 0.45–4.5)
UROBILINOGEN UR STRIP-ACNC: <2 MG/DL
WBC # BLD AUTO: 6.96 THOUSAND/UL (ref 4.31–10.16)
WBC #/AREA URNS AUTO: NORMAL /HPF

## 2025-02-11 PROCEDURE — 85025 COMPLETE CBC W/AUTO DIFF WBC: CPT

## 2025-02-11 PROCEDURE — 80061 LIPID PANEL: CPT

## 2025-02-11 PROCEDURE — 84443 ASSAY THYROID STIM HORMONE: CPT

## 2025-02-11 PROCEDURE — 81001 URINALYSIS AUTO W/SCOPE: CPT

## 2025-02-14 ENCOUNTER — OFFICE VISIT (OUTPATIENT)
Dept: FAMILY MEDICINE CLINIC | Facility: CLINIC | Age: 40
End: 2025-02-14
Payer: COMMERCIAL

## 2025-02-14 VITALS
HEART RATE: 86 BPM | WEIGHT: 125.6 LBS | SYSTOLIC BLOOD PRESSURE: 104 MMHG | OXYGEN SATURATION: 98 % | HEIGHT: 67 IN | DIASTOLIC BLOOD PRESSURE: 72 MMHG | TEMPERATURE: 98.9 F | RESPIRATION RATE: 14 BRPM | BODY MASS INDEX: 19.71 KG/M2

## 2025-02-14 DIAGNOSIS — H61.22 IMPACTED CERUMEN OF LEFT EAR: ICD-10-CM

## 2025-02-14 DIAGNOSIS — Z00.00 ANNUAL PHYSICAL EXAM: Primary | ICD-10-CM

## 2025-02-14 PROBLEM — A08.4 VIRAL GASTROENTERITIS: Status: RESOLVED | Noted: 2025-02-05 | Resolved: 2025-02-14

## 2025-02-14 PROCEDURE — 69210 REMOVE IMPACTED EAR WAX UNI: CPT | Performed by: NURSE PRACTITIONER

## 2025-02-14 PROCEDURE — 99395 PREV VISIT EST AGE 18-39: CPT | Performed by: NURSE PRACTITIONER

## 2025-02-14 NOTE — PROGRESS NOTES
Adult Annual Physical  Name: Mykel Cano Jr.      : 1985      MRN: [id number]  Encounter Provider: SERGEI Guadarrama  Encounter Date: 2025   Encounter department: [facility]    Assessment & Plan  Annual physical exam  Labs reviewed and normal        Impacted cerumen of left ear    Immunizations and preventive care screenings were discussed with patient today. Appropriate education was printed on patient's after visit summary.    Counseling:  Alcohol/drug use: discussed moderation in alcohol intake, the recommendations for healthy alcohol use, and avoidance of illicit drug use.  Dental Health: discussed importance of regular tooth brushing, flossing, and dental visits.  Injury prevention: discussed safety/seat belts, safety helmets, smoke detectors, carbon monoxide detectors, and smoking near bedding or upholstery.  Sexual health: discussed sexually transmitted diseases, partner selection, use of condoms, avoidance of unintended pregnancy, and contraceptive alternatives.  Exercise: the importance of regular exercise/physical activity was discussed. Recommend exercise 3-5 times per week for at least 30 minutes.          History of Present Illness     Adult Annual Physical:  Patient presents for annual physical. Patient presents today for annual physical  He states that he has left ear is clogged.     General Health:  - Sleep: sleeps well.  - Hearing: normal hearing bilateral ears.  - Vision: no vision problems.  - Dental: regular dental visits.     Health:    - Urinary symptoms: none.     Review of Systems   Constitutional:  Negative for appetite change, diaphoresis, fatigue and fever.   HENT:  Positive for hearing loss.    Respiratory:  Negative for chest tightness and shortness of breath.    Cardiovascular:  Negative for chest pain, palpitations and leg swelling.   Gastrointestinal:  Negative for abdominal pain.   Genitourinary:  Negative for difficulty urinating.  "  Neurological:  Negative for dizziness, light-headedness and headaches.   Psychiatric/Behavioral:  Negative for dysphoric mood and sleep disturbance. The patient is not nervous/anxious.          Objective   /72 (BP Location: Left arm, Patient Position: Sitting, Cuff Size: Adult)   Pulse 86   Temp 98.9 °F (37.2 °C) (Temporal)   Resp 14   Ht 5' 7\" (1.702 m)   Wt 57 kg (125 lb 9.6 oz)   SpO2 98%   BMI 19.67 kg/m²     Physical Exam  Vitals and nursing note reviewed.   Constitutional:       General: He is not in acute distress.     Appearance: He is well-developed and normal weight. He is not ill-appearing, toxic-appearing or diaphoretic.   HENT:      Head: Normocephalic and atraumatic.      Right Ear: Tympanic membrane and external ear normal.      Left Ear: External ear normal. There is impacted cerumen.      Nose: Nose normal.      Mouth/Throat:      Mouth: Mucous membranes are moist.      Pharynx: Uvula midline. No oropharyngeal exudate or posterior oropharyngeal erythema.   Eyes:      General: No scleral icterus.     Extraocular Movements: Extraocular movements intact.      Conjunctiva/sclera: Conjunctivae normal.      Pupils: Pupils are equal, round, and reactive to light.   Neck:      Thyroid: No thyromegaly.      Vascular: No carotid bruit or JVD.   Cardiovascular:      Rate and Rhythm: Normal rate and regular rhythm.      Pulses:           Carotid pulses are 2+ on the right side and 2+ on the left side.     Heart sounds: Normal heart sounds.   Pulmonary:      Effort: Pulmonary effort is normal. No respiratory distress.      Breath sounds: Normal breath sounds.   Abdominal:      General: Bowel sounds are normal. There is no distension.      Palpations: Abdomen is soft.      Tenderness: There is no abdominal tenderness.   Musculoskeletal:         General: Normal range of motion.      Cervical back: Normal range of motion.      Right lower leg: No edema.      Left lower leg: No edema. "   Lymphadenopathy:      Cervical: No cervical adenopathy.   Neurological:      Mental Status: He is alert and oriented to person, place, and time.      Motor: Motor function is intact.      Gait: Gait is intact. Gait normal.   Psychiatric:         Attention and Perception: Attention normal.         Mood and Affect: Mood normal.         Speech: Speech normal.         Behavior: Behavior normal. Behavior is cooperative.         Thought Content: Thought content normal.       Ear cerumen removal    Date/Time: 2/14/2025 8:00 AM    Performed by: SERGEI South  Authorized by: SERGEI South  Universal Protocol:  Consent: Verbal consent obtained.    Patient location:  Clinic  Procedure details:     Local anesthetic:  None    Location:  L ear    Procedure type: irrigation with instrumentation      Instrumentation: curette      Approach:  Natural orifice  Post-procedure details:     Complication:  None    Hearing quality:  Improved    Patient tolerance of procedure:  Tolerated well, no immediate complications

## 2025-02-14 NOTE — PATIENT INSTRUCTIONS
"Patient Education     Routine physical for adults   The Basics   Written by the doctors and editors at [facility]   What is a physical? -- A physical is a routine visit, or \"check-up,\" with your doctor. You might also hear it called a \"wellness visit\" or \"preventive visit.\"  During each visit, the doctor will:   Ask about your physical and mental health   Ask about your habits, behaviors, and lifestyle   Do an exam   Give you vaccines if needed   Talk to you about any medicines you take   Give advice about your health   Answer your questions  Getting regular check-ups is an important part of taking care of your health. It can help your doctor find and treat any problems you have. But it's also important for preventing health problems.  A routine physical is different from a \"sick visit.\" A sick visit is when you see a doctor because of a health concern or problem. Since physicals are scheduled ahead of time, you can think about what you want to ask the doctor.  How often should I get a physical? -- It depends on your age and health. In general, for people age 21 years and older:   If you are younger than 50 years, you might be able to get a physical every 3 years.   If you are 50 years or older, your doctor might recommend a physical every year.  If you have an ongoing health condition, like diabetes or high blood pressure, your doctor will probably want to see you more often.  What happens during a physical? -- In general, each visit will include:   Physical exam - The doctor or nurse will check your height, weight, heart rate, and blood pressure. They will also look at your eyes and ears. They will ask about how you are feeling and whether you have any symptoms that bother you.   Medicines - It's a good idea to bring a list of all the medicines you take to each doctor visit. Your doctor will talk to you about your medicines and answer any questions. Tell them if you are having any side effects that bother you. You " "should also tell them if you are having trouble paying for any of your medicines.   Habits and behaviors - This includes:   Your diet   Your exercise habits   Whether you smoke, drink alcohol, or use drugs   Whether you are sexually active   Whether you feel safe at home  Your doctor will talk to you about things you can do to improve your health and lower your risk of health problems. They will also offer help and support. For example, if you want to quit smoking, they can give you advice and might prescribe medicines. If you want to improve your diet or get more physical activity, they can help you with this, too.   Lab tests, if needed - The tests you get will depend on your age and situation. For example, your doctor might want to check your:   Cholesterol   Blood sugar   Iron level   Vaccines - The recommended vaccines will depend on your age, health, and what vaccines you already had. Vaccines are very important because they can prevent certain serious or deadly infections.   Discussion of screening - \"Screening\" means checking for diseases or other health problems before they cause symptoms. Your doctor can recommend screening based on your age, risk, and preferences. This might include tests to check for:   Cancer, such as breast, prostate, cervical, ovarian, colorectal, prostate, lung, or skin cancer   Sexually transmitted infections, such as chlamydia and gonorrhea   Mental health conditions like depression and anxiety  Your doctor will talk to you about the different types of screening tests. They can help you decide which screenings to have. They can also explain what the results might mean.   Answering questions - The physical is a good time to ask the doctor or nurse questions about your health. If needed, they can refer you to other doctors or specialists, too.  Adults older than 65 years often need other care, too. As you get older, your doctor will talk to you about:   How to prevent falling at " home   Hearing or vision tests   Memory testing   How to take your medicines safely   Making sure that you have the help and support you need at home  All topics are updated as new evidence becomes available and our peer review process is complete.  This topic retrieved from [employer] on: May 02, 2024.  Topic 726690 Version 1.0  Release: 32.4.3 - C32.122  © 2024 [employer] and/or its affiliates. All rights reserved.  Consumer Information Use and Disclaimer   Disclaimer: This generalized information is a limited summary of diagnosis, treatment, and/or medication information. It is not meant to be comprehensive and should be used as a tool to help the user understand and/or assess potential diagnostic and treatment options. It does NOT include all information about conditions, treatments, medications, side effects, or risks that may apply to a specific patient. It is not intended to be medical advice or a substitute for the medical advice, diagnosis, or treatment of a health care provider based on the health care provider's examination and assessment of a patient's specific and unique circumstances. Patients must speak with a health care provider for complete information about their health, medical questions, and treatment options, including any risks or benefits regarding use of medications. This information does not endorse any treatments or medications as safe, effective, or approved for treating a specific patient. [employer], Inc[employer] and its affiliates disclaim any warranty or liability relating to this information or the use thereof.The use of this information is governed by the Terms of Use, available at https://www.woltersMetamarketsuwer.com/en/know/clinical-effectiveness-terms. 2024© [employer]Date, Inc. and its affiliates and/or licensors. All rights reserved.  Copyright   © 2024 [employer], [employer]. and/or its affiliates. All rights reserved.